# Patient Record
Sex: MALE | Race: WHITE | Employment: FULL TIME | URBAN - METROPOLITAN AREA
[De-identification: names, ages, dates, MRNs, and addresses within clinical notes are randomized per-mention and may not be internally consistent; named-entity substitution may affect disease eponyms.]

---

## 2017-03-15 ENCOUNTER — HOSPITAL ENCOUNTER (OUTPATIENT)
Dept: MRI IMAGING | Age: 52
Discharge: HOME | End: 2017-03-15
Attending: PSYCHIATRY & NEUROLOGY
Payer: COMMERCIAL

## 2017-03-15 DIAGNOSIS — G40.209 PARTIAL SYMPTOMATIC EPILEPSY WITH COMPLEX PARTIAL SEIZURES, NOT INTRACTABLE, WITHOUT STATUS EPILEPTICUS (HCC): ICD-10-CM

## 2017-03-15 PROCEDURE — A9577 INJ MULTIHANCE: HCPCS | Performed by: PSYCHIATRY & NEUROLOGY

## 2017-03-15 PROCEDURE — 70553 MRI BRAIN STEM W/O & W/DYE: CPT

## 2017-03-15 PROCEDURE — 74011250636 HC RX REV CODE- 250/636: Performed by: PSYCHIATRY & NEUROLOGY

## 2017-03-15 RX ORDER — SODIUM CHLORIDE 0.9 % (FLUSH) 0.9 %
10 SYRINGE (ML) INJECTION
Status: COMPLETED | OUTPATIENT
Start: 2017-03-15 | End: 2017-03-15

## 2017-03-15 RX ADMIN — GADOBENATE DIMEGLUMINE 20 ML: 529 INJECTION, SOLUTION INTRAVENOUS at 19:42

## 2017-03-15 RX ADMIN — Medication 10 ML: at 19:42

## 2017-12-05 ENCOUNTER — HOSPITAL ENCOUNTER (OUTPATIENT)
Dept: GENERAL RADIOLOGY | Age: 52
Discharge: HOME | End: 2017-12-05
Attending: FAMILY MEDICINE
Payer: COMMERCIAL

## 2017-12-05 DIAGNOSIS — G89.29 CHRONIC BILATERAL LOW BACK PAIN WITHOUT SCIATICA: ICD-10-CM

## 2017-12-05 DIAGNOSIS — M54.50 CHRONIC BILATERAL LOW BACK PAIN WITHOUT SCIATICA: ICD-10-CM

## 2017-12-05 PROCEDURE — 72100 X-RAY EXAM L-S SPINE 2/3 VWS: CPT

## 2018-08-21 ENCOUNTER — HOSPITAL ENCOUNTER (EMERGENCY)
Age: 53
Discharge: HOME | End: 2018-08-21
Attending: EMERGENCY MEDICINE
Payer: COMMERCIAL

## 2018-08-21 ENCOUNTER — APPOINTMENT (OUTPATIENT)
Dept: GENERAL RADIOLOGY | Age: 53
End: 2018-08-21
Attending: EMERGENCY MEDICINE
Payer: COMMERCIAL

## 2018-08-21 VITALS
HEART RATE: 74 BPM | TEMPERATURE: 98.4 F | SYSTOLIC BLOOD PRESSURE: 127 MMHG | RESPIRATION RATE: 20 BRPM | DIASTOLIC BLOOD PRESSURE: 88 MMHG | WEIGHT: 235 LBS | OXYGEN SATURATION: 98 % | HEIGHT: 76 IN | BODY MASS INDEX: 28.62 KG/M2

## 2018-08-21 DIAGNOSIS — R07.89 CHEST PRESSURE: Primary | ICD-10-CM

## 2018-08-21 LAB
ALBUMIN SERPL-MCNC: 3.3 G/DL (ref 3.5–5)
ALBUMIN/GLOB SERPL: 0.8 {RATIO} (ref 1.2–3.5)
ALP SERPL-CCNC: 73 U/L (ref 50–136)
ALT SERPL-CCNC: 67 U/L (ref 12–65)
ANION GAP SERPL CALC-SCNC: 13 MMOL/L (ref 7–16)
AST SERPL-CCNC: 34 U/L (ref 15–37)
BASOPHILS # BLD: 0 K/UL
BASOPHILS NFR BLD: 0 % (ref 0–2)
BILIRUB SERPL-MCNC: 0.5 MG/DL (ref 0.2–1.1)
BUN SERPL-MCNC: 10 MG/DL (ref 6–23)
CALCIUM SERPL-MCNC: 8.3 MG/DL (ref 8.3–10.4)
CHLORIDE SERPL-SCNC: 105 MMOL/L (ref 98–107)
CO2 SERPL-SCNC: 22 MMOL/L (ref 21–32)
CREAT SERPL-MCNC: 0.88 MG/DL (ref 0.8–1.5)
DIFFERENTIAL METHOD BLD: ABNORMAL
EOSINOPHIL # BLD: 0.1 K/UL
EOSINOPHIL NFR BLD: 1 % (ref 0.5–7.8)
ERYTHROCYTE [DISTWIDTH] IN BLOOD BY AUTOMATED COUNT: 13.2 %
GLOBULIN SER CALC-MCNC: 4.1 G/DL (ref 2.3–3.5)
GLUCOSE SERPL-MCNC: 110 MG/DL (ref 65–100)
HCT VFR BLD AUTO: 45.4 % (ref 41.1–50.3)
HGB BLD-MCNC: 15.8 G/DL (ref 13.6–17.2)
IMM GRANULOCYTES # BLD: 0.1 K/UL
IMM GRANULOCYTES NFR BLD AUTO: 1 % (ref 0–5)
LIPASE SERPL-CCNC: 52 U/L (ref 73–393)
LYMPHOCYTES # BLD: 0.9 K/UL
LYMPHOCYTES NFR BLD: 12 % (ref 13–44)
MCH RBC QN AUTO: 32.3 PG (ref 26.1–32.9)
MCHC RBC AUTO-ENTMCNC: 34.8 G/DL (ref 31.4–35)
MCV RBC AUTO: 92.8 FL (ref 79.6–97.8)
MONOCYTES # BLD: 0.6 K/UL
MONOCYTES NFR BLD: 8 % (ref 4–12)
NEUTS SEG # BLD: 5.7 K/UL
NEUTS SEG NFR BLD: 78 % (ref 43–78)
NRBC # BLD: 0 K/UL (ref 0–0.2)
PLATELET # BLD AUTO: 172 K/UL (ref 150–450)
PMV BLD AUTO: 9 FL (ref 9.4–12.3)
POTASSIUM SERPL-SCNC: 3.3 MMOL/L (ref 3.5–5.1)
PROT SERPL-MCNC: 7.4 G/DL (ref 6.3–8.2)
RBC # BLD AUTO: 4.89 M/UL (ref 4.23–5.6)
SODIUM SERPL-SCNC: 140 MMOL/L (ref 136–145)
TROPONIN I BLD-MCNC: 0 NG/ML (ref 0.02–0.05)
TROPONIN I SERPL-MCNC: <0.02 NG/ML (ref 0.02–0.05)
WBC # BLD AUTO: 7.4 K/UL (ref 4.3–11.1)

## 2018-08-21 PROCEDURE — 85025 COMPLETE CBC W/AUTO DIFF WBC: CPT

## 2018-08-21 PROCEDURE — 84484 ASSAY OF TROPONIN QUANT: CPT

## 2018-08-21 PROCEDURE — 80053 COMPREHEN METABOLIC PANEL: CPT

## 2018-08-21 PROCEDURE — 74011000250 HC RX REV CODE- 250: Performed by: EMERGENCY MEDICINE

## 2018-08-21 PROCEDURE — 99285 EMERGENCY DEPT VISIT HI MDM: CPT | Performed by: EMERGENCY MEDICINE

## 2018-08-21 PROCEDURE — 96374 THER/PROPH/DIAG INJ IV PUSH: CPT | Performed by: EMERGENCY MEDICINE

## 2018-08-21 PROCEDURE — 93005 ELECTROCARDIOGRAM TRACING: CPT | Performed by: EMERGENCY MEDICINE

## 2018-08-21 PROCEDURE — 83690 ASSAY OF LIPASE: CPT

## 2018-08-21 PROCEDURE — 74011250636 HC RX REV CODE- 250/636: Performed by: EMERGENCY MEDICINE

## 2018-08-21 PROCEDURE — 71046 X-RAY EXAM CHEST 2 VIEWS: CPT

## 2018-08-21 PROCEDURE — 74011250637 HC RX REV CODE- 250/637: Performed by: EMERGENCY MEDICINE

## 2018-08-21 RX ORDER — LIDOCAINE HYDROCHLORIDE 20 MG/ML
15 SOLUTION OROPHARYNGEAL
Status: COMPLETED | OUTPATIENT
Start: 2018-08-21 | End: 2018-08-21

## 2018-08-21 RX ORDER — ONDANSETRON 2 MG/ML
4 INJECTION INTRAMUSCULAR; INTRAVENOUS
Status: COMPLETED | OUTPATIENT
Start: 2018-08-21 | End: 2018-08-21

## 2018-08-21 RX ORDER — GUAIFENESIN 100 MG/5ML
324 LIQUID (ML) ORAL
Status: DISCONTINUED | OUTPATIENT
Start: 2018-08-21 | End: 2018-08-21

## 2018-08-21 RX ADMIN — Medication 30 ML: at 12:30

## 2018-08-21 RX ADMIN — LIDOCAINE HYDROCHLORIDE 15 ML: 20 SOLUTION ORAL; TOPICAL at 12:30

## 2018-08-21 RX ADMIN — ONDANSETRON HYDROCHLORIDE 4 MG: 2 INJECTION, SOLUTION INTRAMUSCULAR; INTRAVENOUS at 12:30

## 2018-08-21 NOTE — ED TRIAGE NOTES
Mr. Av Miller started getting SOB and chest pressure yesterday. Pt has also had nausea and diarrhea. Went to doctors and they sent him here. 325mg asa given and 1 nitro. bgl 95. 18g LFA. Nitro relieved any pain and sob.

## 2018-08-21 NOTE — ED NOTES
I have reviewed discharge instructions with the patient. The patient verbalized understanding. Patient to follow up with cardiology as referred and RTED with any changes/concerns. Patient expresses understanding. Patient ambulatory from ED in [facility] with no new Rx.

## 2018-08-21 NOTE — ED PROVIDER NOTES
HPI Comments: Patient started yesterday morning with chest pressure and shortness of breath. Felt like he had a lot of gas in there. Also had nausea vomiting and diarrhea yesterday. Last night drank a Coke and started belching a lot. Symptoms are improved today but still present. Went to [facility] doctor and they gave aspirin and nitroglycerin. Patient feeling better here. Patient is a 48 y.o. male presenting with chest pain. The history is provided by the patient. No  was used. Chest Pain (Angina)    This is a new problem. The current episode started yesterday. The problem has not changed since onset. Duration of episode(s) is 2 days. The problem occurs constantly. The pain is associated with normal activity. The pain is present in the epigastric region (sternal). The pain is moderate. The quality of the pain is described as pressure-like. The pain does not radiate. Associated symptoms include diaphoresis, nausea, shortness of breath and vomiting. Pertinent negatives include no abdominal pain, no back pain, no cough, no dizziness, no exertional chest pressure, no fever, no headaches, no irregular heartbeat, no leg pain, no lower extremity edema, no malaise/fatigue, no numbness, no orthopnea, no palpitations and no weakness. He has tried aspirin and nitroglycerin for the symptoms. The treatment provided moderate relief. Risk factors include male gender. His past medical history does not include DM or HTN. Past Medical History:   Diagnosis Date    Bipolar disorder ([facility] [address]     Hyperlipemia     Seizures ([facility] [address]        Past Surgical History:   Procedure Laterality Date    HX ORTHOPAEDIC           Family History:   Problem Relation Age of Onset    No Known Problems Mother     No Known Problems Father        Social History     Social History    Marital status:      Spouse name: N/A    Number of children: N/A    Years of education: N/A     Occupational History    Not on file. Social History Main Topics    Smoking status: Former Smoker    Smokeless tobacco: Never Used    Alcohol use 6.0 - 7.2 oz/week     10 - 12 Cans of beer, 0 Standard drinks or equivalent per week    Drug use: Not on file    Sexual activity: Not on file     Other Topics Concern    Not on file     Social History Narrative         ALLERGIES: Review of patient's allergies indicates no known allergies. Review of Systems   Constitutional: Positive for diaphoresis. Negative for chills, fever and malaise/fatigue. HENT: Negative for rhinorrhea and sore throat. Eyes: Negative for pain and redness. Respiratory: Positive for shortness of breath. Negative for cough, chest tightness and wheezing. Cardiovascular: Positive for chest pain. Negative for palpitations, orthopnea and leg swelling. Gastrointestinal: Positive for diarrhea, nausea and vomiting. Negative for abdominal pain. Genitourinary: Negative for dysuria and hematuria. Musculoskeletal: Negative for back pain, gait problem, neck pain and neck stiffness. Skin: Negative for color change and rash. Neurological: Negative for dizziness, weakness, numbness and headaches. Vitals:    08/21/18 1148   BP: (!) 129/93   Pulse: 94   Resp: 16   Temp: 98.4 °F (36.9 °C)   SpO2: 99%   Weight: 106.6 kg (235 lb)   Height: 6' 4\" (1.93 m)            Physical Exam   Constitutional: He is oriented to person, place, and time. He appears well-developed and well-nourished. HENT:   Head: Normocephalic and atraumatic. Neck: Normal range of motion. Neck supple. Cardiovascular: Normal rate and regular rhythm. No murmur heard. Pulmonary/Chest: Effort normal and breath sounds normal. He has no wheezes. He exhibits no tenderness. Abdominal: Soft. Bowel sounds are normal. There is no tenderness. Musculoskeletal: Normal range of motion. He exhibits no edema. Neurological: He is alert and oriented to person, place, and time. Skin: Skin is warm and dry. Nursing note and vitals reviewed. MDM  Number of Diagnoses or Management Options  Diagnosis management comments: Chest pressure and bloating. No EKG changes and 2 negative troponins. We'll refer to cardiology. Amount and/or Complexity of Data Reviewed  Clinical lab tests: ordered and reviewed  Tests in the radiology section of CPT®: ordered and reviewed  Tests in the medicine section of CPT®: ordered and reviewed    Patient Progress  Patient progress: stable        ED Course       Procedures    EKG: normal sinus rhythm, nonspecific ST and T waves changes. Rate 99. XR CHEST PA LAT (Final result) Result time: 08/21/18 12:16:40     Final result by Bill Mancuso MD (08/21/18 12:16:40)     Impression:     IMPRESSION: Small band of atelectasis, otherwise unremarkable.         Narrative:     CHEST X-RAY, 2 views. HISTORY:  Shortness breath and chest tightness. TECHNIQUE: PA and lateral views. COMPARISON: None. FINDINGS: Inspiration is shallow. Linear band of atelectasis left midlung zone. Costophrenic angles are sharp.  Heart and pulmonary vasculature is unremarkable.                  Results Include:    Recent Results (from the past 24 hour(s))   AMB POC COMPLETE CBC,AUTOMATED ENTER (RFM)    Collection Time: 08/21/18 10:30 AM   Result Value Ref Range    WBC (POC) 7.0 3.4 - 10.0 10^3/ul    LYMPHOCYTES (POC) 17.2 15.0 - 45.0 %    MONOCYTES (POC) 4.8 2.0 - 11.0 %    GRANULOCYTES (POC) 78.0 (A) 45.0 - 75.0 %    RBC (POC) 4.71 4.50 - 5.90 10^6/ul    HGB (POC) 14.9 13.6 - 17.5 g/dL    HCT (POC) 44.6 41.0 - 53.0 %    MCV (POC) 94.9 80 - 100 fL    MCH (POC) 31.6 26 - 34 pg    MCHC (POC) 33.3 31 - 37 g/dL    PLATELET (POC) 408 277 - 440 10^3/ul   EKG, 12 LEAD, INITIAL    Collection Time: 08/21/18 11:49 AM   Result Value Ref Range    Ventricular Rate 99 BPM    Atrial Rate 99 BPM    P-R Interval 158 ms    QRS Duration 92 ms    Q-T Interval 350 ms    QTC Calculation (Bezet) 449 ms Calculated P Axis 38 degrees    Calculated R Axis 13 degrees    Diagnosis       Normal sinus rhythm  Normal ECG  No previous ECGs available     CBC WITH AUTOMATED DIFF    Collection Time: 08/21/18 11:55 AM   Result Value Ref Range    WBC 7.4 4.3 - 11.1 K/uL    RBC 4.89 4.23 - 5.6 M/uL    HGB 15.8 13.6 - 17.2 g/dL    HCT 45.4 41.1 - 50.3 %    MCV 92.8 79.6 - 97.8 FL    MCH 32.3 26.1 - 32.9 PG    MCHC 34.8 31.4 - 35.0 g/dL    RDW 13.2 %    PLATELET 151 052 - 873 K/uL    MPV 9.0 (L) 9.4 - 12.3 FL    ABSOLUTE NRBC 0.00 0.0 - 0.2 K/uL    NEUTROPHILS 78 43 - 78 %    LYMPHOCYTES 12 (L) 13 - 44 %    MONOCYTES 8 4.0 - 12.0 %    EOSINOPHILS 1 0.5 - 7.8 %    BASOPHILS 0 0.0 - 2.0 %    IMMATURE GRANULOCYTES 1 0.0 - 5.0 %    ABS. NEUTROPHILS 5.7 K/UL    ABS. LYMPHOCYTES 0.9 K/UL    ABS. MONOCYTES 0.6 K/UL    ABS. EOSINOPHILS 0.1 K/UL    ABS. BASOPHILS 0.0 K/UL    ABS. IMM. GRANS. 0.1 K/UL    DF AUTOMATED     METABOLIC PANEL, COMPREHENSIVE    Collection Time: 08/21/18 11:55 AM   Result Value Ref Range    Sodium 140 136 - 145 mmol/L    Potassium 3.3 (L) 3.5 - 5.1 mmol/L    Chloride 105 98 - 107 mmol/L    CO2 22 21 - 32 mmol/L    Anion gap 13 7 - 16 mmol/L    Glucose 110 (H) 65 - 100 mg/dL    BUN 10 6 - 23 MG/DL    Creatinine 0.88 0.8 - 1.5 MG/DL    GFR est AA >60 >60 ml/min/1.73m2    GFR est non-AA >60 >60 ml/min/1.73m2    Calcium 8.3 8.3 - 10.4 MG/DL    Bilirubin, total 0.5 0.2 - 1.1 MG/DL    ALT (SGPT) 67 (H) 12 - 65 U/L    AST (SGOT) 34 15 - 37 U/L    Alk.  phosphatase 73 50 - 136 U/L    Protein, total 7.4 6.3 - 8.2 g/dL    Albumin 3.3 (L) 3.5 - 5.0 g/dL    Globulin 4.1 (H) 2.3 - 3.5 g/dL    A-G Ratio 0.8 (L) 1.2 - 3.5     TROPONIN I    Collection Time: 08/21/18 11:55 AM   Result Value Ref Range    Troponin-I, Qt. <0.02 (L) 0.02 - 0.05 NG/ML   LIPASE    Collection Time: 08/21/18 11:55 AM   Result Value Ref Range    Lipase 52 (L) 73 - 393 U/L   POC TROPONIN-I    Collection Time: 08/21/18  3:12 PM   Result Value Ref Range Troponin-I (POC) 0 (L) 0.02 - 0.05 ng/ml       POC TROPONIN-I (Final result)    Abnormal Component (Lab Inquiry)       Collection Time Result Time TNIPOC     08/21/18 15:12:00 08/21/18 15:25:32 (NOTE)   Values ranging from 0.00 to 0.08 ng/ml represent a 99 percentile    ranking of individuals from a test population that demonstrates a    healthy clinical picture. The ACC recommends that the term myocardial infarction   should be used when there is evidence of myocardial necrosis   in a clinical setting consistent with myocardial ischemia. Under these conditions, any of the following meet the   diagnosis for myocardial infarction:      Detection of rise and/or fall of cardiac biomarkers   (preferably Troponin-I) with at least one value above the   99th percentile of the upper reference limit (URL) together   with evidence of myocardial ischemia with one of the   following: symptoms of ischemia, ECG changes indicative of   new ischemia, development of pathological Q waves, imaging   evidence of new loss of viable myocardium or new regional   wall motion abnormality. Sequential testing is recommended. Cardiac Troponin-I has a    relatively long half-life and may b e present well after the CK MB has    returned to baseline.    ' data-bubble=\"[email or web address]\">0 (L)  (NOTE)   Values ranging. ..

## 2018-08-21 NOTE — DISCHARGE INSTRUCTIONS
Chest Pain: Care Instructions  Your Care Instructions    There are many things that can cause chest pain. Some are not serious and will get better on their own in a few days. But some kinds of chest pain need more testing and treatment. Your doctor may have recommended a follow-up visit in the next 8 to 12 hours. If you are not getting better, you may need more tests or treatment. Even though your doctor has released you, you still need to watch for any problems. The doctor carefully checked you, but sometimes problems can develop later. If you have new symptoms or if your symptoms do not get better, get medical care right away. If you have worse or different chest pain or pressure that lasts more than 5 minutes or you passed out (lost consciousness), call 911 or seek other emergency help right away. A medical visit is only one step in your treatment. Even if you feel better, you still need to do what your doctor recommends, such as going to all suggested follow-up appointments and taking medicines exactly as directed. This will help you recover and help prevent future problems. How can you care for yourself at home? · Rest until you feel better. · Take your medicine exactly as prescribed. Call your doctor if you think you are having a problem with your medicine. · Do not drive after taking a prescription pain medicine. When should you call for help? Call 911 if:    · You passed out (lost consciousness).     · You have severe difficulty breathing.     · You have symptoms of a heart attack. These may include:  ¨ Chest pain or pressure, or a strange feeling in your chest.  ¨ Sweating. ¨ Shortness of breath. ¨ Nausea or vomiting. ¨ Pain, pressure, or a strange feeling in your back, neck, jaw, or upper belly or in one or both shoulders or arms. ¨ Lightheadedness or sudden weakness. ¨ A fast or irregular heartbeat.   After you call 911, the  may tell you to chew 1 adult-strength or 2 to 4 low-dose aspirin. Wait for an ambulance. Do not try to drive yourself.    Call your doctor today if:    · You have any trouble breathing.     · Your chest pain gets worse.     · You are dizzy or lightheaded, or you feel like you may faint.     · You are not getting better as expected.     · You are having new or different chest pain. Where can you learn more? Go to http://nikhil-sylvia.info/. Enter A120 in the search box to learn more about \"Chest Pain: Care Instructions. \"  Current as of: November 20, 2017  Content Version: 11.7  © 9866-8442 [employer]. Care instructions adapted under license by [employer] (which disclaims liability or warranty for this information). If you have questions about a medical condition or this instruction, always ask your healthcare professional. [address] any warranty or liability for your use of this information.

## 2018-08-22 LAB
ATRIAL RATE: 99 BPM
CALCULATED P AXIS, ECG09: 38 DEGREES
CALCULATED R AXIS, ECG10: 13 DEGREES
DIAGNOSIS, 93000: NORMAL
P-R INTERVAL, ECG05: 158 MS
Q-T INTERVAL, ECG07: 350 MS
QRS DURATION, ECG06: 92 MS
QTC CALCULATION (BEZET), ECG08: 449 MS
VENTRICULAR RATE, ECG03: 99 BPM

## 2020-02-26 PROBLEM — N48.6 PEYRONIE'S DISEASE: Status: ACTIVE | Noted: 2020-02-26

## 2021-03-17 ENCOUNTER — ANESTHESIA EVENT (OUTPATIENT)
Dept: SURGERY | Age: 56
End: 2021-03-17
Payer: COMMERCIAL

## 2021-03-18 ENCOUNTER — ANESTHESIA (OUTPATIENT)
Dept: SURGERY | Age: 56
End: 2021-03-18
Payer: COMMERCIAL

## 2021-03-18 ENCOUNTER — HOSPITAL ENCOUNTER (OUTPATIENT)
Age: 56
Setting detail: OBSERVATION
Discharge: HOME | End: 2021-03-19
Attending: UROLOGY | Admitting: UROLOGY
Payer: COMMERCIAL

## 2021-03-18 DIAGNOSIS — N52.9 ERECTILE DYSFUNCTION, UNSPECIFIED ERECTILE DYSFUNCTION TYPE: ICD-10-CM

## 2021-03-18 DIAGNOSIS — Z98.890 S/P UROLOGICAL SURGERY: Primary | ICD-10-CM

## 2021-03-18 DIAGNOSIS — N48.6 PEYRONIE'S DISEASE: ICD-10-CM

## 2021-03-18 LAB
APPEARANCE UR: CLEAR
BILIRUB UR QL: NEGATIVE
COLOR UR: YELLOW
CREAT SERPL-MCNC: 1.34 MG/DL (ref 0.8–1.5)
ERYTHROCYTE [DISTWIDTH] IN BLOOD BY AUTOMATED COUNT: 13.2 % (ref 11.9–14.6)
GLUCOSE UR STRIP.AUTO-MCNC: NEGATIVE MG/DL
HCT VFR BLD AUTO: 44.3 % (ref 41.1–50.3)
HGB BLD-MCNC: 14.7 G/DL (ref 13.6–17.2)
HGB UR QL STRIP: NEGATIVE
KETONES UR QL STRIP.AUTO: NEGATIVE MG/DL
LEUKOCYTE ESTERASE UR QL STRIP.AUTO: NEGATIVE
MCH RBC QN AUTO: 31.4 PG (ref 26.1–32.9)
MCHC RBC AUTO-ENTMCNC: 33.2 G/DL (ref 31.4–35)
MCV RBC AUTO: 94.7 FL (ref 79.6–97.8)
NITRITE UR QL STRIP.AUTO: NEGATIVE
NRBC # BLD: 0 K/UL (ref 0–0.2)
PH UR STRIP: 5.5 [PH] (ref 5–9)
PLATELET # BLD AUTO: 165 K/UL (ref 150–450)
PMV BLD AUTO: 8.8 FL (ref 9.4–12.3)
PROT UR STRIP-MCNC: NEGATIVE MG/DL
RBC # BLD AUTO: 4.68 M/UL (ref 4.23–5.6)
SP GR UR REFRACTOMETRY: 1.02 (ref 1–1.02)
UROBILINOGEN UR QL STRIP.AUTO: 0.2 EU/DL (ref 0.2–1)
WBC # BLD AUTO: 6 K/UL (ref 4.3–11.1)

## 2021-03-18 PROCEDURE — 77030010509 HC AIRWY LMA MSK TELE -A: Performed by: ANESTHESIOLOGY

## 2021-03-18 PROCEDURE — 82565 ASSAY OF CREATININE: CPT

## 2021-03-18 PROCEDURE — 81003 URINALYSIS AUTO W/O SCOPE: CPT

## 2021-03-18 PROCEDURE — 74011250636 HC RX REV CODE- 250/636: Performed by: UROLOGY

## 2021-03-18 PROCEDURE — 74011250636 HC RX REV CODE- 250/636: Performed by: NURSE ANESTHETIST, CERTIFIED REGISTERED

## 2021-03-18 PROCEDURE — 74011000258 HC RX REV CODE- 258: Performed by: UROLOGY

## 2021-03-18 PROCEDURE — 74011000250 HC RX REV CODE- 250: Performed by: NURSE ANESTHETIST, CERTIFIED REGISTERED

## 2021-03-18 PROCEDURE — 74011000250 HC RX REV CODE- 250: Performed by: UROLOGY

## 2021-03-18 PROCEDURE — 77030040922 HC BLNKT HYPOTHRM STRY -A: Performed by: ANESTHESIOLOGY

## 2021-03-18 PROCEDURE — 2709999900 HC NON-CHARGEABLE SUPPLY

## 2021-03-18 PROCEDURE — 77030020229 HC RETRCTR SCROT SYS BSC -D: Performed by: UROLOGY

## 2021-03-18 PROCEDURE — 36415 COLL VENOUS BLD VENIPUNCTURE: CPT

## 2021-03-18 PROCEDURE — 74011250637 HC RX REV CODE- 250/637: Performed by: ANESTHESIOLOGY

## 2021-03-18 PROCEDURE — 77030002966 HC SUT PDS J&J -A: Performed by: UROLOGY

## 2021-03-18 PROCEDURE — 76060000036 HC ANESTHESIA 2.5 TO 3 HR: Performed by: UROLOGY

## 2021-03-18 PROCEDURE — 2709999900 HC NON-CHARGEABLE SUPPLY: Performed by: UROLOGY

## 2021-03-18 PROCEDURE — 77030002888 HC SUT CHRMC J&J -A: Performed by: UROLOGY

## 2021-03-18 PROCEDURE — 99218 HC RM OBSERVATION: CPT

## 2021-03-18 PROCEDURE — 76210000006 HC OR PH I REC 0.5 TO 1 HR: Performed by: UROLOGY

## 2021-03-18 PROCEDURE — 77030040831 HC BAG URINE DRNG MDII -A: Performed by: UROLOGY

## 2021-03-18 PROCEDURE — 76010000172 HC OR TIME 2.5 TO 3 HR INTENSV-TIER 1: Performed by: UROLOGY

## 2021-03-18 PROCEDURE — 77030019908 HC STETH ESOPH SIMS -A: Performed by: ANESTHESIOLOGY

## 2021-03-18 PROCEDURE — 77030034696 HC CATH URETH FOL 2W BARD -A: Performed by: UROLOGY

## 2021-03-18 PROCEDURE — 77030013782 HC KT PENIL INFL AMS -C: Performed by: UROLOGY

## 2021-03-18 PROCEDURE — 77030018673: Performed by: UROLOGY

## 2021-03-18 PROCEDURE — 74011250636 HC RX REV CODE- 250/636: Performed by: ANESTHESIOLOGY

## 2021-03-18 PROCEDURE — 77030040361 HC SLV COMPR DVT MDII -B: Performed by: UROLOGY

## 2021-03-18 PROCEDURE — 77030040361 HC SLV COMPR DVT MDII -B

## 2021-03-18 PROCEDURE — 54405 INSERT MULTI-COMP PENIS PROS: CPT | Performed by: UROLOGY

## 2021-03-18 PROCEDURE — 77030040503 HC DRN WND PENRS MDII -A: Performed by: UROLOGY

## 2021-03-18 PROCEDURE — 74011250637 HC RX REV CODE- 250/637: Performed by: UROLOGY

## 2021-03-18 PROCEDURE — 77030020269 HC MISC IMPL: Performed by: UROLOGY

## 2021-03-18 PROCEDURE — 85027 COMPLETE CBC AUTOMATED: CPT

## 2021-03-18 PROCEDURE — 77030031139 HC SUT VCRL2 J&J -A: Performed by: UROLOGY

## 2021-03-18 PROCEDURE — 77030010507 HC ADH SKN DERMBND J&J -B: Performed by: UROLOGY

## 2021-03-18 RX ORDER — SODIUM CHLORIDE, SODIUM LACTATE, POTASSIUM CHLORIDE, CALCIUM CHLORIDE 600; 310; 30; 20 MG/100ML; MG/100ML; MG/100ML; MG/100ML
100 INJECTION, SOLUTION INTRAVENOUS CONTINUOUS
Status: DISCONTINUED | OUTPATIENT
Start: 2021-03-18 | End: 2021-03-18 | Stop reason: HOSPADM

## 2021-03-18 RX ORDER — FENTANYL CITRATE 50 UG/ML
INJECTION, SOLUTION INTRAMUSCULAR; INTRAVENOUS AS NEEDED
Status: DISCONTINUED | OUTPATIENT
Start: 2021-03-18 | End: 2021-03-18 | Stop reason: HOSPADM

## 2021-03-18 RX ORDER — VANCOMYCIN HYDROCHLORIDE 1 G/20ML
INJECTION, POWDER, LYOPHILIZED, FOR SOLUTION INTRAVENOUS EVERY 24 HOURS
Status: DISCONTINUED | OUTPATIENT
Start: 2021-03-18 | End: 2021-03-19 | Stop reason: SDUPTHER

## 2021-03-18 RX ORDER — SODIUM CHLORIDE 0.9 % (FLUSH) 0.9 %
5-40 SYRINGE (ML) INJECTION EVERY 8 HOURS
Status: DISCONTINUED | OUTPATIENT
Start: 2021-03-18 | End: 2021-03-18 | Stop reason: HOSPADM

## 2021-03-18 RX ORDER — GENTAMICIN SULFATE 40 MG/ML
INJECTION, SOLUTION INTRAMUSCULAR; INTRAVENOUS AS NEEDED
Status: DISCONTINUED | OUTPATIENT
Start: 2021-03-18 | End: 2021-03-18 | Stop reason: HOSPADM

## 2021-03-18 RX ORDER — LAMOTRIGINE 100 MG/1
200 TABLET ORAL DAILY
Status: DISCONTINUED | OUTPATIENT
Start: 2021-03-19 | End: 2021-03-19 | Stop reason: HOSPADM

## 2021-03-18 RX ORDER — FENTANYL CITRATE 50 UG/ML
100 INJECTION, SOLUTION INTRAMUSCULAR; INTRAVENOUS ONCE
Status: DISCONTINUED | OUTPATIENT
Start: 2021-03-18 | End: 2021-03-18 | Stop reason: HOSPADM

## 2021-03-18 RX ORDER — NALOXONE HYDROCHLORIDE 0.4 MG/ML
0.04 INJECTION, SOLUTION INTRAMUSCULAR; INTRAVENOUS; SUBCUTANEOUS
Status: DISCONTINUED | OUTPATIENT
Start: 2021-03-18 | End: 2021-03-18 | Stop reason: HOSPADM

## 2021-03-18 RX ORDER — MIDAZOLAM HYDROCHLORIDE 1 MG/ML
2 INJECTION, SOLUTION INTRAMUSCULAR; INTRAVENOUS
Status: COMPLETED | OUTPATIENT
Start: 2021-03-18 | End: 2021-03-18

## 2021-03-18 RX ORDER — HYDROMORPHONE HYDROCHLORIDE 1 MG/ML
1 INJECTION, SOLUTION INTRAMUSCULAR; INTRAVENOUS; SUBCUTANEOUS
Status: DISCONTINUED | OUTPATIENT
Start: 2021-03-18 | End: 2021-03-19 | Stop reason: HOSPADM

## 2021-03-18 RX ORDER — SODIUM CHLORIDE 0.9 % (FLUSH) 0.9 %
5-40 SYRINGE (ML) INJECTION EVERY 8 HOURS
Status: DISCONTINUED | OUTPATIENT
Start: 2021-03-18 | End: 2021-03-19 | Stop reason: HOSPADM

## 2021-03-18 RX ORDER — LIDOCAINE HYDROCHLORIDE 10 MG/ML
0.1 INJECTION INFILTRATION; PERINEURAL AS NEEDED
Status: DISCONTINUED | OUTPATIENT
Start: 2021-03-18 | End: 2021-03-18 | Stop reason: HOSPADM

## 2021-03-18 RX ORDER — ONDANSETRON 2 MG/ML
4 INJECTION INTRAMUSCULAR; INTRAVENOUS
Status: DISCONTINUED | OUTPATIENT
Start: 2021-03-18 | End: 2021-03-19 | Stop reason: HOSPADM

## 2021-03-18 RX ORDER — HYDROCODONE BITARTRATE AND ACETAMINOPHEN 5; 325 MG/1; MG/1
1 TABLET ORAL
Status: DISCONTINUED | OUTPATIENT
Start: 2021-03-18 | End: 2021-03-19 | Stop reason: HOSPADM

## 2021-03-18 RX ORDER — MIDAZOLAM HYDROCHLORIDE 1 MG/ML
2 INJECTION, SOLUTION INTRAMUSCULAR; INTRAVENOUS ONCE
Status: DISCONTINUED | OUTPATIENT
Start: 2021-03-18 | End: 2021-03-18 | Stop reason: HOSPADM

## 2021-03-18 RX ORDER — LAMOTRIGINE 100 MG/1
300 TABLET ORAL
Status: DISCONTINUED | OUTPATIENT
Start: 2021-03-18 | End: 2021-03-19 | Stop reason: HOSPADM

## 2021-03-18 RX ORDER — PROPOFOL 10 MG/ML
INJECTION, EMULSION INTRAVENOUS AS NEEDED
Status: DISCONTINUED | OUTPATIENT
Start: 2021-03-18 | End: 2021-03-18 | Stop reason: HOSPADM

## 2021-03-18 RX ORDER — EPHEDRINE SULFATE/0.9% NACL/PF 50 MG/5 ML
SYRINGE (ML) INTRAVENOUS AS NEEDED
Status: DISCONTINUED | OUTPATIENT
Start: 2021-03-18 | End: 2021-03-18 | Stop reason: HOSPADM

## 2021-03-18 RX ORDER — ONDANSETRON 2 MG/ML
INJECTION INTRAMUSCULAR; INTRAVENOUS AS NEEDED
Status: DISCONTINUED | OUTPATIENT
Start: 2021-03-18 | End: 2021-03-18 | Stop reason: HOSPADM

## 2021-03-18 RX ORDER — DEXTROSE MONOHYDRATE AND SODIUM CHLORIDE 5; .45 G/100ML; G/100ML
75 INJECTION, SOLUTION INTRAVENOUS CONTINUOUS
Status: DISCONTINUED | OUTPATIENT
Start: 2021-03-18 | End: 2021-03-19 | Stop reason: HOSPADM

## 2021-03-18 RX ORDER — SODIUM CHLORIDE 0.9 % (FLUSH) 0.9 %
5-40 SYRINGE (ML) INJECTION AS NEEDED
Status: DISCONTINUED | OUTPATIENT
Start: 2021-03-18 | End: 2021-03-19 | Stop reason: HOSPADM

## 2021-03-18 RX ORDER — CEFAZOLIN SODIUM/WATER 2 G/20 ML
2 SYRINGE (ML) INTRAVENOUS ONCE
Status: DISCONTINUED | OUTPATIENT
Start: 2021-03-18 | End: 2021-03-18 | Stop reason: HOSPADM

## 2021-03-18 RX ORDER — HYDROMORPHONE HYDROCHLORIDE 1 MG/ML
0.5 INJECTION, SOLUTION INTRAMUSCULAR; INTRAVENOUS; SUBCUTANEOUS
Status: DISCONTINUED | OUTPATIENT
Start: 2021-03-18 | End: 2021-03-18 | Stop reason: HOSPADM

## 2021-03-18 RX ORDER — ACETAMINOPHEN 325 MG/1
650 TABLET ORAL
Status: DISCONTINUED | OUTPATIENT
Start: 2021-03-18 | End: 2021-03-19 | Stop reason: HOSPADM

## 2021-03-18 RX ORDER — SODIUM CHLORIDE 0.9 % (FLUSH) 0.9 %
5-40 SYRINGE (ML) INJECTION AS NEEDED
Status: DISCONTINUED | OUTPATIENT
Start: 2021-03-18 | End: 2021-03-18 | Stop reason: HOSPADM

## 2021-03-18 RX ORDER — CEFAZOLIN SODIUM 1 G/3ML
INJECTION, POWDER, FOR SOLUTION INTRAMUSCULAR; INTRAVENOUS AS NEEDED
Status: DISCONTINUED | OUTPATIENT
Start: 2021-03-18 | End: 2021-03-18 | Stop reason: HOSPADM

## 2021-03-18 RX ORDER — OXYCODONE HYDROCHLORIDE 5 MG/1
5 TABLET ORAL
Status: COMPLETED | OUTPATIENT
Start: 2021-03-18 | End: 2021-03-18

## 2021-03-18 RX ORDER — LIDOCAINE HYDROCHLORIDE 20 MG/ML
INJECTION, SOLUTION EPIDURAL; INFILTRATION; INTRACAUDAL; PERINEURAL AS NEEDED
Status: DISCONTINUED | OUTPATIENT
Start: 2021-03-18 | End: 2021-03-18 | Stop reason: HOSPADM

## 2021-03-18 RX ORDER — NALOXONE HYDROCHLORIDE 0.4 MG/ML
0.4 INJECTION, SOLUTION INTRAMUSCULAR; INTRAVENOUS; SUBCUTANEOUS AS NEEDED
Status: DISCONTINUED | OUTPATIENT
Start: 2021-03-18 | End: 2021-03-19 | Stop reason: HOSPADM

## 2021-03-18 RX ADMIN — PHENYLEPHRINE HYDROCHLORIDE 100 MCG: 10 INJECTION INTRAVENOUS at 08:12

## 2021-03-18 RX ADMIN — FENTANYL CITRATE 50 MCG: 50 INJECTION INTRAMUSCULAR; INTRAVENOUS at 07:47

## 2021-03-18 RX ADMIN — FENTANYL CITRATE 50 MCG: 50 INJECTION INTRAMUSCULAR; INTRAVENOUS at 10:07

## 2021-03-18 RX ADMIN — PROPOFOL 100 MG: 10 INJECTION, EMULSION INTRAVENOUS at 07:46

## 2021-03-18 RX ADMIN — PROPOFOL 200 MG: 10 INJECTION, EMULSION INTRAVENOUS at 07:43

## 2021-03-18 RX ADMIN — FENTANYL CITRATE 50 MCG: 50 INJECTION INTRAMUSCULAR; INTRAVENOUS at 09:06

## 2021-03-18 RX ADMIN — HYDROCODONE BITARTRATE AND ACETAMINOPHEN 1 TABLET: 5; 325 TABLET ORAL at 23:55

## 2021-03-18 RX ADMIN — SODIUM CHLORIDE, SODIUM LACTATE, POTASSIUM CHLORIDE, AND CALCIUM CHLORIDE: 600; 310; 30; 20 INJECTION, SOLUTION INTRAVENOUS at 08:27

## 2021-03-18 RX ADMIN — LIDOCAINE HYDROCHLORIDE 100 MG: 20 INJECTION, SOLUTION EPIDURAL; INFILTRATION; INTRACAUDAL; PERINEURAL at 07:43

## 2021-03-18 RX ADMIN — Medication 10 MG: at 08:27

## 2021-03-18 RX ADMIN — PROPOFOL 30 MG: 10 INJECTION, EMULSION INTRAVENOUS at 09:05

## 2021-03-18 RX ADMIN — PHENYLEPHRINE HYDROCHLORIDE 100 MCG: 10 INJECTION INTRAVENOUS at 08:27

## 2021-03-18 RX ADMIN — FENTANYL CITRATE 50 MCG: 50 INJECTION INTRAMUSCULAR; INTRAVENOUS at 08:20

## 2021-03-18 RX ADMIN — DEXTROSE MONOHYDRATE AND SODIUM CHLORIDE 75 ML/HR: 5; .45 INJECTION, SOLUTION INTRAVENOUS at 18:16

## 2021-03-18 RX ADMIN — SODIUM CHLORIDE, SODIUM LACTATE, POTASSIUM CHLORIDE, AND CALCIUM CHLORIDE 100 ML/HR: 600; 310; 30; 20 INJECTION, SOLUTION INTRAVENOUS at 06:47

## 2021-03-18 RX ADMIN — OXYCODONE 5 MG: 5 TABLET ORAL at 12:30

## 2021-03-18 RX ADMIN — MIDAZOLAM 2 MG: 1 INJECTION INTRAMUSCULAR; INTRAVENOUS at 06:57

## 2021-03-18 RX ADMIN — GENTAMICIN SULFATE 530 MG: 40 INJECTION, SOLUTION INTRAMUSCULAR; INTRAVENOUS at 06:48

## 2021-03-18 RX ADMIN — Medication 10 MG: at 10:13

## 2021-03-18 RX ADMIN — Medication 10 MG: at 07:54

## 2021-03-18 RX ADMIN — ONDANSETRON 4 MG: 2 INJECTION INTRAMUSCULAR; INTRAVENOUS at 10:11

## 2021-03-18 RX ADMIN — PHENYLEPHRINE HYDROCHLORIDE 100 MCG: 10 INJECTION INTRAVENOUS at 08:05

## 2021-03-18 RX ADMIN — Medication 5 ML: at 18:20

## 2021-03-18 RX ADMIN — LAMOTRIGINE 300 MG: 100 TABLET ORAL at 23:54

## 2021-03-18 RX ADMIN — PHENYLEPHRINE HYDROCHLORIDE 100 MCG: 10 INJECTION INTRAVENOUS at 07:55

## 2021-03-18 NOTE — PROGRESS NOTES
TRANSFER - IN REPORT:    Verbal report received from Joel Petit RN on [facility]  being received from PACU for routine progression of care      Report consisted of patients Situation, Background, Assessment and   Recommendations(SBAR). Information from the following report(s) SBAR was reviewed with the receiving nurse. Opportunity for questions and clarification was provided. Assessment completed upon patients arrival to unit and care assumed.

## 2021-03-18 NOTE — PERIOP NOTES
Report to Marta Castaneda RN for continuation of care. Care assumed at this time. Pt resting in bed, pt denies any additional needs at this time. Pt respirations even and unlabored. Pt bed locked and low, call bell within reach. Vital signs being monitored.

## 2021-03-18 NOTE — ANESTHESIA POSTPROCEDURE EVALUATION
Procedure(s):  PENILE PROSTHESIS INSERTION INFLATABLE/ POSS PLAQUE MODELING OR INCISION OF PLAQUE. general    Anesthesia Post Evaluation      Multimodal analgesia: multimodal analgesia used between 6 hours prior to anesthesia start to PACU discharge  Patient location during evaluation: PACU  Patient participation: complete - patient participated  Level of consciousness: awake and alert  Pain management: adequate  Airway patency: patent  Anesthetic complications: no  Cardiovascular status: acceptable  Respiratory status: acceptable  Hydration status: acceptable  Post anesthesia nausea and vomiting:  controlled  Final Post Anesthesia Temperature Assessment:  Normothermia (36.0-37.5 degrees C)      INITIAL Post-op Vital signs:   Vitals Value Taken Time   /88 03/18/21 1431   Temp 37 °C (98.6 °F) 03/18/21 1115   Pulse 102 03/18/21 1435   Resp 14 03/18/21 1310   SpO2 94 % 03/18/21 1435   Vitals shown include unvalidated device data.

## 2021-03-18 NOTE — PERIOP NOTES
TRANSFER - OUT REPORT:    Verbal report given to [facility] RN on Cristian Powell  being transferred to  for routine post - op       Report consisted of patients Situation, Background, Assessment and   Recommendations(SBAR). Information from the following report(s) SBAR, OR Summary, Procedure Summary, Intake/Output and MAR was reviewed with the receiving nurse. Lines:   Peripheral IV 03/18/21 Right Hand (Active)   Site Assessment Clean, dry, & intact 03/18/21 1115   Phlebitis Assessment 0 03/18/21 1115   Infiltration Assessment 0 03/18/21 1115   Dressing Status Clean, dry, & intact; Occlusive 03/18/21 1115   Dressing Type Transparent;Tape 03/18/21 1115   Hub Color/Line Status Green;Patent 03/18/21 1115   Alcohol Cap Used No 03/18/21 1115        Opportunity for questions and clarification was provided. Patient transported with:   O2 @ 4 liters    VTE prophylaxis orders have been written for Cristian Powell. Patient and family given floor number and nurses name. Family updated re: pt status after security code verified.

## 2021-03-18 NOTE — BRIEF OP NOTE
Brief Postoperative Note    Patient: Willie Castellanos  YOB: 1965  MRN: [id number]    Date of Procedure: 3/18/2021     Pre-Op Diagnosis: Erectile dysfunction, unspecified erectile dysfunction type [N52.9]  Peyronie's disease [N48.6]    Post-Op Diagnosis: Same as preoperative diagnosis. Procedure(s):  PENILE PROSTHESIS INSERTION INFLATABLE/ POSS PLAQUE MODELING OR INCISION OF PLAQUE    Surgeon(s):  Jazmin Ospina MD    Surgical Assistant: None    Anesthesia: General     Estimated Blood Loss (mL): less than 50     Complications: None    Specimens: * No specimens in log *     Implants:   Implant Name Type Inv.  Item Serial No.  Lot No. LRB No. Used Action    Accessory Kit     [id number] N/A 1 Implanted    Spherical Resevoir with InhibiZone     [id number] N/A 1 Implanted   ams 700 cx ms pump     [id number] N/A 1 Implanted   RTE 2.0 CM     [id number] N/A 1 Implanted       Drains: * No LDAs found *    Findings: see op note    Electronically Signed by Duc Sterling MD on 3/18/2021 at 10:31 AM

## 2021-03-18 NOTE — OP NOTES
[facility]  OPERATIVE REPORT    Name:  Inez Carlson  MR#:  [id number]  :  1965  ACCOUNT #:  [de-identified]  DATE OF SERVICE:  2021    PREOPERATIVE DIAGNOSES:  1. Peyronie disease. 2.  Erectile dysfunction. POSTOPERATIVE DIAGNOSES:  1. Peyronie disease. 2.  Erectile dysfunction. PROCEDURE PERFORMED:  Placement of inflatable penile prosthesis with manual modeling of penile curvature. SURGEON:  Alexis Amado MD    ASSISTANT:  None. ANESTHESIA:  General.    COMPLICATIONS:  None. SPECIMENS REMOVED:  None. IMPLANTS:   CX prosthesis. ESTIMATED BLOOD LOSS:  20 mL    FINDINGS:  The patient had penile curvature. Test erection after placement of IPP shows approximately 30-degree curvature dorsally into the left which was repaired by penile modeling. Placement of  CX device with 18-cm cylinders plus 2-cm rear tip extenders. PROCEDURE:  The patient was given a general anesthetic and placed in the supine position. The penis and lower abdomen were shaved, prepped and draped in sterile fashion. The usual prep was performed for placement of inflatable penile prosthesis. Examination shows a normal penis. We did a test inflation by placing a tourniquet proximally and placing a butterfly needle. Injectable saline was used to generate a moderate erection. On exam, the patient had a plaque about mid dorsum but the curvature was more distally and to the left. The concavity was distal to the obvious large plaque. The penis was deflated. Castellano catheter was placed. We used a ring retractor with a hook through the penile meatus placing it on stretch over a silicone band. A penoscrotal incision was made transversely. We dissected down to the proximal corpora. Corporotomy was made on the left side with the Bovie. Stay sutures of 2-0 PDS were placed on either side. I dilated proximally and distally with the Tamiko Lizz dilators from 9 up to 13 mm. I was able to then measure the corpus on the left side at a total of 20 cm. There was 8 cm proximally and 7 distally. Procedure was repeated on the right side, again placing corporotomy, stay sutures, and dilating proximally and distally. The right side also measured at 20 cm with 12 plus 8 cm. We chose an  CX device with an 18-cm cylinder and 2-cm rear tip extenders. We placed the 65-mL spherical reservoir by piercing the floor of the inguinal canal on the right side. It was placed in the space of Retzius and appeared to be in good position. We inflated it with 65 mL of saline. At this point, we proceeded to a test erection. We placed the previously noted penile prosthesis using the Donnell insertion device. After these were placed, we closed the corporotomies by tying the stay sutures across the midline. A test inflation was made by connecting a large syringe to the pump. The penis was inflated. There was moderate curvature to the left distally. We clamped the tubing to the syringe and also to the cylinders using a rubber shod device. I then did manual modeling to straighten out the curvature. This was done under pressure for at least 30 seconds. The penis did show satisfactory straightening. I thought that the distal cylinder on the right was not reaching the mid glans. We had removed these sutures from the ends of the cylinders. I opened the corporotomy on the right side and removed the cylinder. We redilated distally using the [facility] dilators again up to 12. I then removed the device and measured proximally and distally. Again, it measured 20 cm. We replaced it by placing a 3-0 Vicryl through the tip of the cylinder and used the Donnell insertion device with the Sonia needle to place the device again in the right corpus. Corporotomy was closed and we again did a test erection.   I felt that both of the cylinders were of equal placement distally and that the glans was a little floppy but we did not think we could place the cylinder any more distally in the glans. The curvature was definitely improved. At this point, the pump was placed in the subdartos position on the right scrotum. We made the connection using a quick-connect device between the reservoir and the pump assembly. I placed two pumps in the cylinders prior to closure. The dartos fascia was closed using running 3-0 Vicryl. Skin was closed using interrupted 3-0 chromic. Dermabond was placed on the skin. We wrapped the penis in Coban and did a foam tape pressure dressing across the scrotum. Castellano was left indwelling. PLAN:  He will be admitted for observation.       Jorge Sandifer, MD [id number]  D:  03/18/2021 10:41  T:  03/18/2021 19:02  JOB #:  [id number]

## 2021-03-18 NOTE — H&P
Cristian Powell  : 1965         Chief Complaint   Patient presents with    Peyronie's Disease    Follow-up         HPI      Cristian Powell is a 50 y.o. male Referred by Dr. Krys Barnes for evaluation and treatment of Peyronie's disease. He noted dorsal penile curvature around 2019. He noted this fairly acutely, noted it overnight he has Viagra but doesn't need it for erections. Curvature doesn't interfere with intercourse. Doesn't recall any injury   Exam showed 3 cm distal dorsal plaque. He brought in phone photo of his erect penis. Has 50 degree curvature dorsally due to distal dorsal plaque . Completed 1 cycle of Xiaflex on 20. He stated that he is no better.     Completed 2nd cycle Xiaflex 20. He has been doing stretching exercises and states that his erections are still curved dorsally, he estimates that he is 10% better. He doesn't think that he can have sex with the amount of curvature.   Here for test erection, marking  Injected 0.1 ml Trimix. Had good erection with dorsal curvature. Marked concavity with marker.   Had 2nd injection of 3rd cycle 20. He sates that his penile curvature is no better. Completed 4th cycle 21. His wife is with him today. She states that the curvature is so bad that it hurts her. His curvature is no better. Has partial ED.           Past Medical History:   Diagnosis Date    Bipolar disorder ([facility] [address]      Hyperlipemia      Peyronie's disease      Seizures (HCC)              Past Surgical History:   Procedure Laterality Date    HX ORTHOPAEDIC                 Current Outpatient Medications   Medication Sig Dispense Refill    LORazepam (ATIVAN) 0.5 mg tablet Take 1 tab one hour prior to procedure. May repeat at MRI if still anxious        trimethoprim-sulfamethoxazole (Bactrim DS) 160-800 mg per tablet Take 1 Tab by mouth two (2) times a day.  Start 3 days before surgery 40 Tab 0    ciprofloxacin HCl (Cipro) 500 mg tablet Take 1 Tab by mouth two (2) times a day for 20 days. Start 3 days before procedure 40 Tab 0    lamoTRIgine (LaMICtal) 100 mg tablet Take 2 in AM and 3 at hs 70 Tab 3    simvastatin (ZOCOR) 40 mg tablet TAKE 1 TABLET DAILY IN THE EVENING 90 Tab 3    sildenafil citrate (VIAGRA) 50 mg tablet Take 1 Tab by mouth as needed for Erectile Dysfunction. 30 Tab 1    omeprazole (PRILOSEC) 40 mg capsule TAKE 1 CAPSULE DAILY 90 Cap 4      No Known Allergies  Social History            Socioeconomic History    Marital status:        Spouse name: Not on file    Number of children: Not on file    Years of education: Not on file    Highest education level: Not on file   Occupational History    Not on file   Social Needs    Financial resource strain: Not on file    Food insecurity       Worry: Not on file       Inability: Not on file    Transportation needs       Medical: Not on file       Non-medical: Not on file   Tobacco Use    Smoking status: Former Smoker    Smokeless tobacco: Never Used   Substance and Sexual Activity    Alcohol use:  Yes       Alcohol/week: 10.0 - 12.0 standard drinks       Types: 10 - 12 Cans of beer per week    Drug use: Never    Sexual activity: Yes       Partners: Female   Lifestyle    Physical activity       Days per week: Not on file       Minutes per session: Not on file    Stress: Not on file   Relationships    Social connections       Talks on phone: Not on file       Gets together: Not on file       Attends Faith service: Not on file       Active member of club or organization: Not on file       Attends meetings of clubs or organizations: Not on file       Relationship status: Not on file    Intimate partner violence       Fear of current or ex partner: Not on file       Emotionally abused: Not on file       Physically abused: Not on file       Forced sexual activity: Not on file   Other Topics Concern    Not on file   Social History Narrative    Not on file            Family History   Problem Relation Age of Onset    No Known Problems Mother      No Known Problems Father           ROS     Visit Vitals  BP (!) 142/99   Pulse 81   Temp 97.6 °F (36.4 °C) (Temporal)   Wt 233 lb (105.7 kg)   BMI 28.36 kg/m²   Physical Exam  General   Mental Status - Patient is alert and oriented X3. Build & Nutrition - Well nourished.        Chest and Lung Exam   Chest and lung exam reveals  - normal excursion with symmetric chest walls, quiet, even and easy respiratory effort with no use of accessory muscles and on auscultation, normal breath sounds, no adventitious sounds and normal vocal resonance.        Cardiovascular   Cardiovascular examination reveals  - normal heart sounds, regular rate and rhythm with no murmurs.        Abdomen   Palpation/Percussion: Palpation and Percussion of the abdomen reveal - Non Tender, No Rebound tenderness, No Rigidity (guarding), No hepatosplenomegaly, No Palpable abdominal masses and Soft. Hernia - Bilateral - No Hernia(s) present.       Physical Exam     Assessment and Plan      ICD-10-CM ICD-9-CM     1. Peyronie's disease  N48.6 607.85     2. Erectile dysfunction, unspecified erectile dysfunction type  N52.9 607.84 trimethoprim-sulfamethoxazole (Bactrim DS) 160-800 mg per tablet         ciprofloxacin HCl (Cipro) 500 mg tablet              Orders Placed This Encounter    LORazepam (ATIVAN) 0.5 mg tablet       Sig: Take 1 tab one hour prior to procedure. May repeat at MRI if still anxious    trimethoprim-sulfamethoxazole (Bactrim DS) 160-800 mg per tablet       Sig: Take 1 Tab by mouth two (2) times a day. Start 3 days before surgery       Dispense:  40 Tab       Refill:  0    ciprofloxacin HCl (Cipro) 500 mg tablet       Sig: Take 1 Tab by mouth two (2) times a day for 20 days. Start 3 days before procedure       Dispense:  40 Tab       Refill:  0      Discussed different approaches, plaque excision with grafting, Nondalton tuck or IPP.    Would be good candidate for  CX , possible plaque modeling or incision of plaque.    Discussed risks of bleeding, infection, malfunction, erosion, penile shortening.

## 2021-03-18 NOTE — ANESTHESIA PREPROCEDURE EVALUATION
Relevant Problems   No relevant active problems       Anesthetic History   No history of anesthetic complications            Review of Systems / Medical History  Pertinent labs reviewed    Pulmonary  Within defined limits                 Neuro/Psych     seizures (complex seizures, last 2 months ago): well controlled    Psychiatric history     Cardiovascular  Within defined limits                Exercise tolerance: >4 METS     GI/Hepatic/Renal     GERD: well controlled           Endo/Other  Within defined limits           Other Findings            Physical Exam    Airway  Mallampati: II  TM Distance: 4 - 6 cm  Neck ROM: normal range of motion   Mouth opening: Normal     Cardiovascular  Regular rate and rhythm,  S1 and S2 normal,  no murmur, click, rub, or gallop             Dental    Dentition: Caps/crowns     Pulmonary  Breath sounds clear to auscultation               Abdominal  GI exam deferred       Other Findings            Anesthetic Plan    ASA: 2  Anesthesia type: general          Induction: Intravenous  Anesthetic plan and risks discussed with: Patient

## 2021-03-18 NOTE — PROGRESS NOTES
03/18/21 1759   Dual Skin Pressure Injury Assessment   Dual Skin Pressure Injury Assessment WDL   Second Care Provider (Based on [address] [place] [address]) Adonis Londono RN    Skin Integumentary   Skin Integumentary (WDL) X   Skin Color Appropriate for ethnicity   Skin Condition/Temp Warm;Dry   Skin Integrity Tattoos (comment); Incision (comment)  (penis)   Wound Prevention and Protection Methods   Orientation of Wound Prevention Posterior   Location of Wound Prevention Sacrum/Coccyx   Dressing Present  No   Wound Offloading (Prevention Methods) Bed, pressure reduction mattress

## 2021-03-19 VITALS
OXYGEN SATURATION: 94 % | DIASTOLIC BLOOD PRESSURE: 79 MMHG | TEMPERATURE: 98.5 F | SYSTOLIC BLOOD PRESSURE: 114 MMHG | HEIGHT: 76 IN | WEIGHT: 237 LBS | HEART RATE: 79 BPM | BODY MASS INDEX: 28.86 KG/M2 | RESPIRATION RATE: 17 BRPM

## 2021-03-19 PROCEDURE — 99218 HC RM OBSERVATION: CPT

## 2021-03-19 PROCEDURE — 74011000258 HC RX REV CODE- 258: Performed by: UROLOGY

## 2021-03-19 PROCEDURE — 74011000250 HC RX REV CODE- 250: Performed by: UROLOGY

## 2021-03-19 PROCEDURE — 74011250636 HC RX REV CODE- 250/636: Performed by: UROLOGY

## 2021-03-19 PROCEDURE — 74011250637 HC RX REV CODE- 250/637: Performed by: UROLOGY

## 2021-03-19 PROCEDURE — 2709999900 HC NON-CHARGEABLE SUPPLY

## 2021-03-19 RX ORDER — HYDROCODONE BITARTRATE AND ACETAMINOPHEN 5; 325 MG/1; MG/1
1 TABLET ORAL
Qty: 20 TAB | Refills: 0 | Status: SHIPPED | OUTPATIENT
Start: 2021-03-19 | End: 2021-03-22

## 2021-03-19 RX ORDER — VANCOMYCIN/0.9 % SOD CHLORIDE 1.5G/250ML
1500 PLASTIC BAG, INJECTION (ML) INTRAVENOUS EVERY 12 HOURS
Status: DISCONTINUED | OUTPATIENT
Start: 2021-03-19 | End: 2021-03-19 | Stop reason: HOSPADM

## 2021-03-19 RX ADMIN — HYDROMORPHONE HYDROCHLORIDE 1 MG: 1 INJECTION, SOLUTION INTRAMUSCULAR; INTRAVENOUS; SUBCUTANEOUS at 01:50

## 2021-03-19 RX ADMIN — ACETAMINOPHEN 650 MG: 325 TABLET ORAL at 13:39

## 2021-03-19 RX ADMIN — DEXTROSE MONOHYDRATE AND SODIUM CHLORIDE 75 ML/HR: 5; .45 INJECTION, SOLUTION INTRAVENOUS at 07:49

## 2021-03-19 RX ADMIN — GENTAMICIN SULFATE 480 MG: 40 INJECTION, SOLUTION INTRAMUSCULAR; INTRAVENOUS at 07:49

## 2021-03-19 RX ADMIN — HYDROCODONE BITARTRATE AND ACETAMINOPHEN 1 TABLET: 5; 325 TABLET ORAL at 04:25

## 2021-03-19 RX ADMIN — VANCOMYCIN HYDROCHLORIDE 1500 MG: 10 INJECTION, POWDER, LYOPHILIZED, FOR SOLUTION INTRAVENOUS at 01:53

## 2021-03-19 RX ADMIN — VANCOMYCIN HYDROCHLORIDE 1500 MG: 10 INJECTION, POWDER, LYOPHILIZED, FOR SOLUTION INTRAVENOUS at 12:00

## 2021-03-19 RX ADMIN — LAMOTRIGINE 200 MG: 100 TABLET ORAL at 07:50

## 2021-03-19 RX ADMIN — HYDROMORPHONE HYDROCHLORIDE 1 MG: 1 INJECTION, SOLUTION INTRAMUSCULAR; INTRAVENOUS; SUBCUTANEOUS at 07:50

## 2021-03-19 NOTE — PROGRESS NOTES
Admit Date: 3/18/2021    Subjective:     Meliton Garrido is resting.  Castellano in place, urine clear/yellow.  Reports surgical pain.    Objective:     Patient Vitals for the past 8 hrs:   BP Temp Pulse Resp SpO2   03/19/21 0735 124/75 98.5 °F (36.9 °C) 80 20 94 %   03/19/21 0433 130/84 97.6 °F (36.4 °C) 78 18 94 %     No intake/output data recorded.  03/17 1901 - 03/19 0700  In: 1200 [I.V.:1200]  Out: 1200 [Urine:1000]    Physical Exam:  GENERAL: alert, cooperative, no distress  LUNG: clear to auscultation bilaterally  HEART: regular rate and rhythm, S1, S2   ABDOMEN: soft, non-tender  NEUROLOGIC: AOx3  : scrotal ecchymosis, incision intact, no hematoma    Data Review   Recent Results (from the past 24 hour(s))   CREATININE    Collection Time: 03/18/21  6:24 PM   Result Value Ref Range    Creatinine 1.34 0.8 - 1.5 MG/DL       Assessment:     Active Problems:    Peyronie's disease (2/26/2020)      POD 1:    POSTOPERATIVE DIAGNOSES:  1.  Peyronie disease.  2.  Erectile dysfunction.     PROCEDURE PERFORMED:  Placement of inflatable penile prosthesis with manual modeling of penile curvature.    Afebrile, VSS    Plan:     Surgical dressings removed. Castellano removed.  Continue PRN pain control.  Home when pt voiding.  Place scrotal support on pt fluffed with gauze.  Needs ice pack today.     Delmis Cruz, VENUS  [facility]  Pt wo complaints. PE: penis normal. Scrotum with mild edema. OK for discharge.   I have reviewed the above note and examined the patient.  I agree with the exam, assessment and plan.  Return in 10 days. Start pulling pump down in scrotum when he returns.,   Pillo Roberts Jr., MD

## 2021-03-19 NOTE — DISCHARGE INSTRUCTIONS
Give information packet to patient at time of discharge (should be in his chart), remind patient to bring this to follow up appt. Patient information at time of d/c:    Billa Bolds out full course of antibiotics (typically two antibiotics prescribed)    Pull pump down into scrotum DAILY to avoid it riding up (start this in about 4-5 days)    Fluff scrotal support with 4x4s    May use Ice pack around the clock and prn. Shower pod 2, no tub baths.

## 2021-03-19 NOTE — PROGRESS NOTES
Care Management Interventions  Mode of Transport at Discharge: Self  Transition of Care Consult (CM Consult): Discharge Planning  Discharge Durable Medical Equipment: No  Physical Therapy Consult: No  Occupational Therapy Consult: No  Current Support Network: Own Home, Lives with Spouse  Confirm Follow Up Transport: Self  [place] Resource Information Provided?: No  Discharge Location  Discharge Placement: Home    Chart screened by  for discharge planning. Patient will likely return home at discharge. PT/OT have not been consulted at this time. CM will continue to follow patient during hospitalization for discharge planning and needs. No needs identified at this time. Please consult or notify  if any new issues arise.

## 2021-03-19 NOTE — PROGRESS NOTES
Pharmacokinetic Consult to Pharmacist    Rachna Juárez is a 50 y.o. male being treated for surgical prophylaxis with vancomycin and gentamicin. Height: 6' 4\" (193 cm)  Weight: 107.5 kg (237 lb)  Lab Results   Component Value Date/Time    BUN 10 02/26/2019 08:45 AM    Creatinine 1.34 03/18/2021 06:24 PM    WBC 6.0 03/18/2021 06:23 AM      Estimated Creatinine Clearance: 85.8 mL/min (based on SCr of 1.34 mg/dL). No results found for: Siena Moreira    Day 1 of vancomycin. Goal trough is 10-20. Will initiate 1500 mg q12 h. Day 2 of gentamicin. Patient was given one time dose of 530 mg post-op and is now on 480 mg q 24 h. Will continue to dose based on [place] nomogram.     Pharmacy will continue to monitor, order levels, and change doses as clinically appropriate.       Thank you,  Hardy Lucas, PharmD  Clinical Pharmacy Specialist  [phone]

## 2021-03-25 ENCOUNTER — APPOINTMENT (OUTPATIENT)
Dept: GENERAL RADIOLOGY | Age: 56
End: 2021-03-25
Attending: EMERGENCY MEDICINE
Payer: COMMERCIAL

## 2021-03-25 ENCOUNTER — HOSPITAL ENCOUNTER (EMERGENCY)
Age: 56
Discharge: HOME | End: 2021-03-25
Attending: EMERGENCY MEDICINE
Payer: COMMERCIAL

## 2021-03-25 VITALS
RESPIRATION RATE: 18 BRPM | SYSTOLIC BLOOD PRESSURE: 107 MMHG | DIASTOLIC BLOOD PRESSURE: 78 MMHG | TEMPERATURE: 98.8 F | HEART RATE: 96 BPM | OXYGEN SATURATION: 95 %

## 2021-03-25 DIAGNOSIS — U07.1 COVID-19 VIRUS INFECTION: Primary | ICD-10-CM

## 2021-03-25 LAB
ALBUMIN SERPL-MCNC: 3.2 G/DL (ref 3.5–5)
ALBUMIN/GLOB SERPL: 0.8 {RATIO} (ref 1.2–3.5)
ALP SERPL-CCNC: 59 U/L (ref 50–136)
ALT SERPL-CCNC: 42 U/L (ref 12–65)
ANION GAP SERPL CALC-SCNC: 8 MMOL/L (ref 7–16)
AST SERPL-CCNC: 38 U/L (ref 15–37)
BASOPHILS # BLD: 0 K/UL (ref 0–0.2)
BASOPHILS NFR BLD: 0 % (ref 0–2)
BILIRUB SERPL-MCNC: 0.5 MG/DL (ref 0.2–1.1)
BUN SERPL-MCNC: 17 MG/DL (ref 6–23)
CALCIUM SERPL-MCNC: 8.2 MG/DL (ref 8.3–10.4)
CHLORIDE SERPL-SCNC: 101 MMOL/L (ref 98–107)
CO2 SERPL-SCNC: 24 MMOL/L (ref 21–32)
CREAT SERPL-MCNC: 1.27 MG/DL (ref 0.8–1.5)
DIFFERENTIAL METHOD BLD: ABNORMAL
EOSINOPHIL # BLD: 0 K/UL (ref 0–0.8)
EOSINOPHIL NFR BLD: 0 % (ref 0.5–7.8)
ERYTHROCYTE [DISTWIDTH] IN BLOOD BY AUTOMATED COUNT: 12.5 % (ref 11.9–14.6)
GLOBULIN SER CALC-MCNC: 3.9 G/DL (ref 2.3–3.5)
GLUCOSE SERPL-MCNC: 101 MG/DL (ref 65–100)
HCT VFR BLD AUTO: 39.1 % (ref 41.1–50.3)
HGB BLD-MCNC: 13.5 G/DL (ref 13.6–17.2)
IMM GRANULOCYTES # BLD AUTO: 0.1 K/UL (ref 0–0.5)
IMM GRANULOCYTES NFR BLD AUTO: 1 % (ref 0–5)
LYMPHOCYTES # BLD: 1 K/UL (ref 0.5–4.6)
LYMPHOCYTES NFR BLD: 13 % (ref 13–44)
MCH RBC QN AUTO: 31.9 PG (ref 26.1–32.9)
MCHC RBC AUTO-ENTMCNC: 34.5 G/DL (ref 31.4–35)
MCV RBC AUTO: 92.4 FL (ref 79.6–97.8)
MONOCYTES # BLD: 0.7 K/UL (ref 0.1–1.3)
MONOCYTES NFR BLD: 9 % (ref 4–12)
NEUTS SEG # BLD: 5.8 K/UL (ref 1.7–8.2)
NEUTS SEG NFR BLD: 77 % (ref 43–78)
NRBC # BLD: 0 K/UL (ref 0–0.2)
PLATELET # BLD AUTO: 148 K/UL (ref 150–450)
PMV BLD AUTO: 9.1 FL (ref 9.4–12.3)
POTASSIUM SERPL-SCNC: 3.7 MMOL/L (ref 3.5–5.1)
PROT SERPL-MCNC: 7.1 G/DL (ref 6.3–8.2)
RBC # BLD AUTO: 4.23 M/UL (ref 4.23–5.6)
SODIUM SERPL-SCNC: 133 MMOL/L (ref 138–145)
WBC # BLD AUTO: 7.6 K/UL (ref 4.3–11.1)

## 2021-03-25 PROCEDURE — 99284 EMERGENCY DEPT VISIT MOD MDM: CPT

## 2021-03-25 PROCEDURE — 71046 X-RAY EXAM CHEST 2 VIEWS: CPT

## 2021-03-25 PROCEDURE — 80053 COMPREHEN METABOLIC PANEL: CPT

## 2021-03-25 PROCEDURE — 85025 COMPLETE CBC W/AUTO DIFF WBC: CPT

## 2021-03-25 PROCEDURE — 74011250636 HC RX REV CODE- 250/636: Performed by: EMERGENCY MEDICINE

## 2021-03-25 RX ORDER — DEXAMETHASONE 6 MG/1
6 TABLET ORAL
Qty: 5 TAB | Refills: 0 | Status: SHIPPED | OUTPATIENT
Start: 2021-03-25 | End: 2021-03-30

## 2021-03-25 RX ORDER — DOXYCYCLINE HYCLATE 100 MG
100 TABLET ORAL 2 TIMES DAILY
Qty: 14 TAB | Refills: 0 | Status: SHIPPED | OUTPATIENT
Start: 2021-03-25 | End: 2021-04-01

## 2021-03-25 RX ADMIN — SODIUM CHLORIDE 1000 ML: 900 INJECTION, SOLUTION INTRAVENOUS at 15:18

## 2021-03-25 NOTE — ED NOTES
Pt ambulated down barrera on RA and saturations remained above 93%. Pt reports dizziness upon standing. Kj Bradley MD aware.

## 2021-03-25 NOTE — ED NOTES
I have reviewed discharge instructions with the patient. The patient verbalized understanding. Patient left ED via Discharge Method: ambulatory to Home with self    Opportunity for questions and clarification provided. Patient given 2 scripts. No esign        To continue your aftercare when you leave the hospital, you may receive an automated call from our care team to check in on how you are doing. This is a free service and part of our promise to provide the best care and service to meet your aftercare needs.  If you have questions, or wish to unsubscribe from this service please call [phone]. Thank you for Choosing our [address] [place] [address] Emergency Department.

## 2021-03-25 NOTE — DISCHARGE INSTRUCTIONS
Take antibiotics as prescribed  It is important as you take Tylenol 1000 mg or ibuprofen 800 mg every 4-6 hours as needed for fever or body aches  Drink plenty of fluids  Monitor your oxygen levels at home  Return to the ER for any new, worsening or life-threatening symptoms

## 2021-03-25 NOTE — ED TRIAGE NOTES
Patient to triage with mask in place via ems in wheelchair. Patient to ed from doctor's care where he presented for increased shortness of breath and cough and per ems is covid positive. Patient he has had headache, fever, chills, shob and cough since Sunday afternoon. Doctors care placed 18 G iv in pt's right ac and gave 1l of normal saline. Patient room air sat with ems was 92% and he was 95% on 2 liters.

## 2021-03-25 NOTE — ED PROVIDER NOTES
Patient is a 29-year-old male who presents to the emergency room by EMS for chief complaint of fever, chills, shortness of breath, generalized fatigue since Sunday. Was at [facility] when he was found to have low 90s on room air. [facility] called EMS to have patient brought to emergency room. Patient denies any significant past medical history, denies tobacco use. The history is provided by the patient. Fatigue  This is a new problem. The current episode started more than 2 days ago. The problem has not changed since onset. Pertinent negatives include no focal weakness, no speech difficulty, no movement disorder, no agitation, no mental status change, no unresponsiveness and no disorientation. Patient reports a subjective fever - was not measured. Associated symptoms include shortness of breath. Pertinent negatives include no chest pain, no vomiting, no altered mental status, no confusion, no choking and no bowel incontinence. Associated medical issues do not include trauma, mood changes, seizures or dementia.         Past Medical History:   Diagnosis Date    Bipolar disorder ([facility] [address]     GERD (gastroesophageal reflux disease)     Hyperlipemia     Peyronie's disease     Seizures (HCC)     managed with Lamictal daily and sees Dr Emili Cunha, neurologist       Past Surgical History:   Procedure Laterality Date    HX ORTHOPAEDIC           Family History:   Problem Relation Age of Onset    No Known Problems Mother     No Known Problems Father        Social History     Socioeconomic History    Marital status:      Spouse name: Not on file    Number of children: Not on file    Years of education: Not on file    Highest education level: Not on file   Occupational History    Not on file   Social Needs    Financial resource strain: Not on file    Food insecurity     Worry: Not on file     Inability: Not on file    Transportation needs     Medical: Not on file     Non-medical: Not on file   Tobacco Use    Smoking status: Former Smoker    Smokeless tobacco: Never Used    Tobacco comment: Vapes   Substance and Sexual Activity    Alcohol use: Yes     Alcohol/week: 10.0 - 12.0 standard drinks     Types: 10 - 12 Cans of beer per week     Comment: 3 cans of beer daily     Drug use: Never    Sexual activity: Yes     Partners: Female   Lifestyle    Physical activity     Days per week: Not on file     Minutes per session: Not on file    Stress: Not on file   Relationships    Social connections     Talks on phone: Not on file     Gets together: Not on file     Attends Pentecostalism service: Not on file     Active member of club or organization: Not on file     Attends meetings of clubs or organizations: Not on file     Relationship status: Not on file    Intimate partner violence     Fear of current or ex partner: Not on file     Emotionally abused: Not on file     Physically abused: Not on file     Forced sexual activity: Not on file   Other Topics Concern    Not on file   Social History Narrative    Not on file         ALLERGIES: Patient has no known allergies. Review of Systems   Constitutional: Positive for fatigue. Negative for chills. HENT: Positive for congestion. Negative for trouble swallowing and voice change. Eyes: Negative for photophobia and redness. Respiratory: Positive for shortness of breath. Negative for choking and chest tightness. Cardiovascular: Negative for chest pain and leg swelling. Gastrointestinal: Negative for abdominal pain, bowel incontinence and vomiting. Endocrine: Negative for polyphagia and polyuria. Genitourinary: Negative for decreased urine volume and urgency. Musculoskeletal: Negative for back pain and gait problem. Skin: Negative for color change and pallor. Neurological: Negative for focal weakness, speech difficulty and weakness. Hematological: Negative for adenopathy. Does not bruise/bleed easily.    Psychiatric/Behavioral: Negative for agitation, confusion, dysphoric mood and hallucinations. All other systems reviewed and are negative. There were no vitals filed for this visit. Physical Exam  Vitals signs and nursing note reviewed. Constitutional:       General: He is not in acute distress. Appearance: Normal appearance. He is not ill-appearing. HENT:      Head: Normocephalic and atraumatic. Eyes:      General:         Right eye: No discharge. Left eye: No discharge. Extraocular Movements: Extraocular movements intact. Pupils: Pupils are equal, round, and reactive to light. Neck:      Musculoskeletal: Normal range of motion and neck supple. No neck rigidity or muscular tenderness. Cardiovascular:      Rate and Rhythm: Normal rate and regular rhythm. Pulses: Normal pulses. Heart sounds: Normal heart sounds. No murmur. No friction rub. No gallop. Pulmonary:      Effort: Pulmonary effort is normal. No respiratory distress. Breath sounds: No stridor. Rhonchi present. No wheezing. Abdominal:      General: Abdomen is flat. There is no distension. Palpations: Abdomen is soft. There is no mass. Hernia: No hernia is present. Musculoskeletal:      Right lower leg: No edema. Left lower leg: No edema. Skin:     General: Skin is warm and dry. Coloration: Skin is not jaundiced or pale. Findings: No bruising or erythema. Neurological:      General: No focal deficit present. Mental Status: He is alert and oriented to person, place, and time. Cranial Nerves: No cranial nerve deficit. Psychiatric:         Mood and Affect: Mood normal.         Behavior: Behavior normal.          MDM  Number of Diagnoses or Management Options  COVID-19 virus infection  Diagnosis management comments: Patient generally well-appearing here, no significant hypoxia during my evaluation. Plan to obtain chest x-ray, basic labs. Hydrate with fluids, check ambulatory sats.     4:21 PM  X-ray appears clear. Normal basic labs. Patient feels better. Will confirm ambulatory sats. 4:50 PM  Ambulatory sats fairly stable. Patient did not drop more than 93%. Reports some mild lightheadedness but feels better now. Overall he generally looks well. Will discharge home. Will be given a pulse ox to monitor oxygen saturations at home       Amount and/or Complexity of Data Reviewed  Clinical lab tests: ordered and reviewed  Tests in the radiology section of CPT®: ordered and reviewed  Review and summarize past medical records: yes  Independent visualization of images, tracings, or specimens: yes    Risk of Complications, Morbidity, and/or Mortality  Presenting problems: moderate  Diagnostic procedures: low  Management options: moderate    Patient Progress  Patient progress: stable         Procedures               Results Include:    Recent Results (from the past 24 hour(s))   CBC WITH AUTOMATED DIFF    Collection Time: 03/25/21 12:33 PM   Result Value Ref Range    WBC 7.6 4.3 - 11.1 K/uL    RBC 4.23 4.23 - 5.6 M/uL    HGB 13.5 (L) 13.6 - 17.2 g/dL    HCT 39.1 (L) 41.1 - 50.3 %    MCV 92.4 79.6 - 97.8 FL    MCH 31.9 26.1 - 32.9 PG    MCHC 34.5 31.4 - 35.0 g/dL    RDW 12.5 11.9 - 14.6 %    PLATELET 222 (L) 501 - 450 K/uL    MPV 9.1 (L) 9.4 - 12.3 FL    ABSOLUTE NRBC 0.00 0.0 - 0.2 K/uL    DF AUTOMATED      NEUTROPHILS 77 43 - 78 %    LYMPHOCYTES 13 13 - 44 %    MONOCYTES 9 4.0 - 12.0 %    EOSINOPHILS 0 (L) 0.5 - 7.8 %    BASOPHILS 0 0.0 - 2.0 %    IMMATURE GRANULOCYTES 1 0.0 - 5.0 %    ABS. NEUTROPHILS 5.8 1.7 - 8.2 K/UL    ABS. LYMPHOCYTES 1.0 0.5 - 4.6 K/UL    ABS. MONOCYTES 0.7 0.1 - 1.3 K/UL    ABS. EOSINOPHILS 0.0 0.0 - 0.8 K/UL    ABS. BASOPHILS 0.0 0.0 - 0.2 K/UL    ABS. IMM.  GRANS. 0.1 0.0 - 0.5 K/UL   METABOLIC PANEL, COMPREHENSIVE    Collection Time: 03/25/21 12:33 PM   Result Value Ref Range    Sodium 133 (L) 138 - 145 mmol/L    Potassium 3.7 3.5 - 5.1 mmol/L    Chloride 101 98 - 107 mmol/L    CO2 24 21 - 32 mmol/L    Anion gap 8 7 - 16 mmol/L    Glucose 101 (H) 65 - 100 mg/dL    BUN 17 6 - 23 MG/DL    Creatinine 1.27 0.8 - 1.5 MG/DL    GFR est AA >60 >60 ml/min/1.73m2    GFR est non-AA >60 >60 ml/min/1.73m2    Calcium 8.2 (L) 8.3 - 10.4 MG/DL    Bilirubin, total 0.5 0.2 - 1.1 MG/DL    ALT (SGPT) 42 12 - 65 U/L    AST (SGOT) 38 (H) 15 - 37 U/L    Alk. phosphatase 59 50 - 136 U/L    Protein, total 7.1 6.3 - 8.2 g/dL    Albumin 3.2 (L) 3.5 - 5.0 g/dL    Globulin 3.9 (H) 2.3 - 3.5 g/dL    A-G Ratio 0.8 (L) 1.2 - 3.5       Voice dictation software was used during the making of this note. This software is not perfect and grammatical and other typographical errors may be present. This note has been proofread, but may still contain errors.   Aliza Watkins MD; 3/25/2021 @4:21 PM   ===================================================================

## 2021-03-25 NOTE — LETTER
[address] [facility] EMERGENCY DEPT 
[address] [facility] [address] 
[phone] Work/School Note Date: 3/25/2021 To Whom It May concern: 
 
Tye William was seen and treated today in the emergency room by the following provider(s): 
Attending Provider: Newton Madden MD.   
 
Tye William was tested positive for COVID 19 and may return to work on 4/3/21. Sincerely, Rito Dupree

## 2022-03-18 PROBLEM — N48.6 PEYRONIE'S DISEASE: Status: ACTIVE | Noted: 2020-02-26

## 2022-11-08 ENCOUNTER — OFFICE VISIT (OUTPATIENT)
Dept: NEUROLOGY | Age: 57
End: 2022-11-08
Payer: COMMERCIAL

## 2022-11-08 VITALS — SYSTOLIC BLOOD PRESSURE: 157 MMHG | HEART RATE: 73 BPM | DIASTOLIC BLOOD PRESSURE: 100 MMHG

## 2022-11-08 DIAGNOSIS — R56.9 PARTIAL SEIZURES (HCC): Primary | ICD-10-CM

## 2022-11-08 PROCEDURE — 99214 OFFICE O/P EST MOD 30 MIN: CPT | Performed by: PSYCHIATRY & NEUROLOGY

## 2022-11-08 RX ORDER — LAMOTRIGINE 100 MG/1
TABLET ORAL
Qty: 450 TABLET | Refills: 3 | Status: SHIPPED | OUTPATIENT
Start: 2022-11-08

## 2022-11-08 RX ORDER — LACOSAMIDE 50 MG/1
TABLET ORAL
Qty: 180 TABLET | Refills: 5 | Status: SHIPPED | OUTPATIENT
Start: 2022-11-08 | End: 2022-11-09 | Stop reason: SDUPTHER

## 2022-11-08 ASSESSMENT — ENCOUNTER SYMPTOMS
ALLERGIC/IMMUNOLOGIC NEGATIVE: 1
GASTROINTESTINAL NEGATIVE: 1
EYES NEGATIVE: 1
RESPIRATORY NEGATIVE: 1

## 2022-11-08 NOTE — PROGRESS NOTES
[address] 60, [address]  [place], [address]  Phone: [phone] Fax [phone]  Dr. Arnulfo Oela      11/8/2022  Keyla [place]     Patient is referred by the following provider for consultation regarding as below:       I reviewed the available records and notes and have examined patient with the following findings:     Chief Complaint:  Chief Complaint   Patient presents with    Neurologic Problem     Unspecified convulsions           HPI: This is a right handed 56 y.o.  male who is very pleasant very appropriate gentleman who keeps having these recurrent episodes he is having for many many years where he just cannot communicate. It last about 10 to 15 seconds at the longest.  There is no loss of consciousness he can hear what is going on he can function when this happens he just cannot orally respond. He can get 1 or 2 of these a month at first if that is what or 2/month that he has 3 or 4 episodes. So each time he has 1 there is multiple. We started him on Lamictal after he was started on a medicine that caused taste changes and the Lamictal he is on 100 mg 2 in the morning 3 at night and it lowered the episodes to 5 episodes per year again in those episodes there is multiple events maybe 3-4 events. We suspect these are partial seizures. Dr. Sapphire Santiago did EEGs that were normal.  He is otherwise stable. He is doing well. IMAGING REVIEW:  I REVIEWED PERTINENT  IMAGES AND REPORTS WITH THE PATIENT PERSONALLY, DIRECTLY AND FULLY.      Past Medical History:  Past Medical History:   Diagnosis Date    Bipolar disorder ([facility] [address]     GERD (gastroesophageal reflux disease)     Hyperlipemia     Peyronie's disease     Seizures ([facility] [address]     managed with Lamictal daily and sees Dr Zelda Payne, neurologist       Past Surgical History:  Past Surgical History:   Procedure Laterality Date    ORTHOPEDIC SURGERY         Social History:  Social History     Socioeconomic History Normocephalic and atraumatic. Eyes:      Extraocular Movements: Extraocular movements intact and EOM normal.      Pupils: Pupils are equal, round, and reactive to light. Cardiovascular:      Rate and Rhythm: Normal rate and regular rhythm. Pulses: Normal pulses. Pulmonary:      Effort: Pulmonary effort is normal.   Abdominal:      General: Abdomen is flat. Neurological:      Mental Status: He is alert and oriented to person, place, and time. Gait: Gait is intact. Deep Tendon Reflexes:      Reflex Scores:       Tricep reflexes are 1+ on the right side and 1+ on the left side. Bicep reflexes are 1+ on the right side and 1+ on the left side. Brachioradialis reflexes are 1+ on the right side and 1+ on the left side. Patellar reflexes are 1+ on the right side and 1+ on the left side. Achilles reflexes are 1+ on the right side and 1+ on the left side. Neurologic Exam     Mental Status   Oriented to person, place, and time. Attention: normal. Concentration: normal.   Level of consciousness: alert  Knowledge: good. Cranial Nerves     CN II   Visual fields full to confrontation. CN III, IV, VI   Pupils are equal, round, and reactive to light. Extraocular motions are normal.     CN VII   Facial expression full, symmetric. Motor Exam   Right arm tone: normal  Left arm tone: normal  Right leg tone: normal  Left leg tone: normal    Gait, Coordination, and Reflexes     Gait  Gait: normal    Tremor   Resting tremor: absent  Intention tremor: absent  Action tremor: absent    Reflexes   Right brachioradialis: 1+  Left brachioradialis: 1+  Right biceps: 1+  Left biceps: 1+  Right triceps: 1+  Left triceps: 1+  Right patellar: 1+  Left patellar: 1+  Right achilles: 1+  Left achilles: 1+        Assessment   Assessment / Plan:    Prince interiano was seen today for neurologic problem.     Diagnoses and all orders for this visit:    Partial seizures [facility]) these are presumed despite the EEG being negative. This gentleman was having 1 or 2 episodes per month and an episode there could be 3 or 4 events of an capably of communicating. The Lamictal 100 mg 2 in the morning 3 at night lowered this significantly to 5 episodes a year but in those episodes she he can have 2 or 3 at 1 time. But a dramatic improvement. Labetalol seems to be causing some short-term memory loss at least he feels it is. The first medicine he started which we will have to go back and look caused change in taste. Dr. Brad Lorenz had ordered that product. At this time I Pedro Ibrahim add Vimpat 50 mg twice a day if this helps we can increase it up to higher levels and may be even lower Lamictal or. He is going to start taking notes on how frequently he is having these episodes. -     lacosamide (VIMPAT) 50 MG TABS tablet; Take one bid    Other orders  -     lamoTRIgine (LAMICTAL) 100 MG tablet; TAKE 2 TABLETS IN THE MORNING AND 3 TABLETS AT BEDTIME        The Diagnosis and differential diagnostic considerations, and Rx Tx were reviewed with the patient at length. No orders of the defined types were placed in this encounter. I have spent greater than 50% of visit discussing and counseling of patient  for treatment and diagnostic plan review. Total time30 min     . Notes: Patient is to continue all medications as directed by prescribing physicians. Continuations on today's visit are made based on the patient's report of current medications.              Dr. Mohan Mojica  Consultation Neurology, Neurodiagnostics and Neurotherapeutics  Neuroelectrophysiology, EEG, EMG  [facility]  [address] [place] [address], [address] [facility]  Phone:  [phone]  Fax:   [phone]

## 2022-11-09 DIAGNOSIS — R56.9 PARTIAL SEIZURES (HCC): ICD-10-CM

## 2022-11-09 RX ORDER — LACOSAMIDE 50 MG/1
TABLET ORAL
Qty: 180 TABLET | Refills: 5 | Status: SHIPPED | OUTPATIENT
Start: 2022-11-09 | End: 2022-12-09

## 2022-11-09 NOTE — TELEPHONE ENCOUNTER
Patient is requesting Vimpat sent to local pharmacy [facility]. [facility] is more expensive. Medication has been pended with correct pharmacy information.

## 2023-05-15 ENCOUNTER — OFFICE VISIT (OUTPATIENT)
Dept: NEUROLOGY | Age: 58
End: 2023-05-15
Payer: COMMERCIAL

## 2023-05-15 VITALS
DIASTOLIC BLOOD PRESSURE: 98 MMHG | BODY MASS INDEX: 30.08 KG/M2 | WEIGHT: 247 LBS | SYSTOLIC BLOOD PRESSURE: 151 MMHG | HEIGHT: 76 IN | OXYGEN SATURATION: 99 % | HEART RATE: 80 BPM

## 2023-05-15 DIAGNOSIS — R56.9 PARTIAL SEIZURES (HCC): ICD-10-CM

## 2023-05-15 PROCEDURE — 99214 OFFICE O/P EST MOD 30 MIN: CPT | Performed by: PSYCHIATRY & NEUROLOGY

## 2023-05-15 RX ORDER — LAMOTRIGINE 100 MG/1
TABLET ORAL
Qty: 450 TABLET | Refills: 3 | Status: SHIPPED | OUTPATIENT
Start: 2023-05-15

## 2023-05-15 RX ORDER — LACOSAMIDE 50 MG/1
TABLET ORAL
Qty: 60 TABLET | Refills: 5 | Status: SHIPPED | OUTPATIENT
Start: 2023-05-15 | End: 2023-06-14

## 2023-05-15 ASSESSMENT — ENCOUNTER SYMPTOMS
RESPIRATORY NEGATIVE: 1
EYES NEGATIVE: 1
ALLERGIC/IMMUNOLOGIC NEGATIVE: 1
GASTROINTESTINAL NEGATIVE: 1

## 2023-05-15 NOTE — PROGRESS NOTES
[address], [address] [facility], [address] [place] [address]  Phone: [phone] Fax [phone]  Dr. Alisson Beltrán      5/15/2023  Sparkle Smith     Patient is referred by the following provider for consultation regarding as below:       I reviewed the available records and notes and have examined patient with the following findings:     Chief Complaint:  Chief Complaint   Patient presents with    Follow-up          HPI: This is a right handed 56 y.o.  male who is very pleasant very appropriate gentleman who unfortunately has these recurrent episodes where he cannot communicate for 10 to 15 seconds. He is alert oriented during the whole thing he can function during it but he just cannot communicate or speak. So most like his brain imploded's. Last short period of time they come with about 5 episodes or 6 episodes when they do come they were coming every month he would get 6 or 7 these episodes but we started him on Lamictal 100 mg he takes 2 in the morning 3 at night and they dropped to 6 or 7 total episodes a year. So a dramatic improvement. We still with an EEG that is normal.  He does have bipolar disorder. But there is been such a dramatic improvement with him we wanted to add Vimpat 50 mg twice a day I a little while ago but unfortunately it never made it to the pharmacy. So he did not try it his last episode was yesterday and prior to that he has had about 5 last week. And that is very typical now they will go away for months. He also knows he cannot operate a motor vehicle until he 6 months episode free. IMAGING REVIEW:  I REVIEWED PERTINENT  IMAGES AND REPORTS WITH THE PATIENT PERSONALLY, DIRECTLY AND FULLY.      Past Medical History:  Past Medical History:   Diagnosis Date    Bipolar disorder ([facility] [address]     GERD (gastroesophageal reflux disease)     Hyperlipemia     Peyronie's disease     Seizures ([facility] [address]     managed with Lamictal daily and sees Dr Agnes Mixon, neurologist

## 2023-12-18 RX ORDER — LAMOTRIGINE 100 MG/1
TABLET ORAL
Qty: 450 TABLET | Refills: 3 | OUTPATIENT
Start: 2023-12-18

## 2024-09-23 ENCOUNTER — TELEPHONE (OUTPATIENT)
Dept: NEUROLOGY | Age: 59
End: 2024-09-23

## 2024-09-24 RX ORDER — LAMOTRIGINE 100 MG/1
TABLET ORAL
Qty: 450 TABLET | Refills: 3 | Status: SHIPPED | OUTPATIENT
Start: 2024-09-24

## 2024-10-18 ENCOUNTER — OFFICE VISIT (OUTPATIENT)
Dept: NEUROLOGY | Age: 59
End: 2024-10-18
Payer: COMMERCIAL

## 2024-10-18 VITALS — HEIGHT: 75 IN | BODY MASS INDEX: 28.85 KG/M2 | WEIGHT: 232 LBS

## 2024-10-18 DIAGNOSIS — R56.9 PARTIAL SEIZURES (HCC): Primary | ICD-10-CM

## 2024-10-18 PROCEDURE — 99214 OFFICE O/P EST MOD 30 MIN: CPT | Performed by: PSYCHIATRY & NEUROLOGY

## 2024-10-18 ASSESSMENT — ENCOUNTER SYMPTOMS
ALLERGIC/IMMUNOLOGIC NEGATIVE: 1
EYES NEGATIVE: 1
GASTROINTESTINAL NEGATIVE: 1
RESPIRATORY NEGATIVE: 1

## 2024-10-18 NOTE — PROGRESS NOTES
[facility]  [address] [facility], [address]  [place] [address]  Phone: [phone] Fax [phone]  Dr. Cezar Puentes      10/18/2024  Gabino Alves     Patient is referred by the following provider for consultation regarding as below:       I reviewed the available records and notes and have examined patient with the following findings:     Chief Complaint:  Chief Complaint   Patient presents with    Follow-up          HPI: This is a right handed 59 y.o.  male who is very pleasant very appropriate patient who unfortunately is having recurrent episodes that last about 10 to 15 seconds where he cannot communicate he is alert oriented he does everything he can drive he can if he is climbing a ladder it does not affect him.  If he is on a roof it does not affect him at.  It is just a communication issue.  When he gets these they are usually several a day before they cooldown.  He has about 6 using Lamictal 100 mg 2 in the morning 3 at night he has about 6 to 7 days a year where he has 2-3 episodes each of those days.  Last time I saw him which was May 2023 we tried him on Vimpat 50 mg twice a day that he does not even recall.  And Dr. John McBurney and tried the previous medication which we think is Dilantin.  He has had EEGs that are normal.  Has a history of bipolar disorder which was Lamictal was chosen initially.  The cross cover.  He feels that he is getting some memory problems associated with Lamictal and is hard to say whether he is or is not but I am willing to try to change the medicine.  He does have he has a full-blown seizure he could not operate a motor vehicle until he 6 months episode free.    IMAGING REVIEW:  I REVIEWED PERTINENT  IMAGES AND REPORTS WITH THE PATIENT PERSONALLY, DIRECTLY AND FULLY.     Past Medical History:  Past Medical History:   Diagnosis Date    Bipolar disorder (HCC)     GERD (gastroesophageal reflux disease)     Hyperlipemia     Peyronie's disease

## 2024-11-01 ENCOUNTER — TELEPHONE (OUTPATIENT)
Dept: NEUROLOGY | Age: 59
End: 2024-11-01

## 2024-11-01 ENCOUNTER — CLINICAL DOCUMENTATION (OUTPATIENT)
Dept: NEUROLOGY | Age: 59
End: 2024-11-01

## 2024-11-01 RX ORDER — ZONISAMIDE 100 MG/1
CAPSULE ORAL
Qty: 360 CAPSULE | Refills: 3 | Status: SHIPPED | OUTPATIENT
Start: 2024-11-01

## 2024-11-01 NOTE — TELEPHONE ENCOUNTER
Patient was seen 10/18. He called saying pharmacy doesn't have a new script. Per last office note. Patient uses [facility]  \"At the moment he has 6 to 7 days/year that he has episodes in and that day he usually has about 3 episodes. This is dramatically better using Lamictal 100 mg 2 in the morning 3 at night but again he wants to change it so we can switch him to zonisamide.\"

## 2025-03-05 ENCOUNTER — OFFICE VISIT (OUTPATIENT)
Dept: NEUROLOGY | Age: 60
End: 2025-03-05
Payer: COMMERCIAL

## 2025-03-05 DIAGNOSIS — R56.9 PARTIAL SEIZURE (HCC): Primary | ICD-10-CM

## 2025-03-05 PROCEDURE — 99214 OFFICE O/P EST MOD 30 MIN: CPT | Performed by: PSYCHIATRY & NEUROLOGY

## 2025-03-05 RX ORDER — ZONISAMIDE 100 MG/1
CAPSULE ORAL
Qty: 360 CAPSULE | Refills: 3 | Status: SHIPPED | OUTPATIENT
Start: 2025-03-05

## 2025-03-05 RX ORDER — LISINOPRIL 20 MG/1
20 TABLET ORAL DAILY
COMMUNITY
Start: 2025-01-21 | End: 2026-01-21

## 2025-03-05 ASSESSMENT — ENCOUNTER SYMPTOMS
ALLERGIC/IMMUNOLOGIC NEGATIVE: 1
EYES NEGATIVE: 1
RESPIRATORY NEGATIVE: 1

## 2025-03-05 NOTE — PROGRESS NOTES
diagnostic plan review. Total time30     .      Notes: Patient is to continue all medications as directed by prescribing physicians. Continuations on today's visit are made based on the patient's report of current medications.             Dr. Cezar Puentes  Consultation Neurology, Neurodiagnostics and Neurotherapeutics  Neuroelectrophysiology, EEG, EMG  [facility]  [address], [address]  [place] [address]  Phone: [phone] Fax [phone]

## 2025-06-19 ENCOUNTER — TELEPHONE (OUTPATIENT)
Dept: NEUROLOGY | Age: 60
End: 2025-06-19

## 2025-06-19 DIAGNOSIS — R56.9 PARTIAL SEIZURE (HCC): ICD-10-CM

## 2025-06-19 RX ORDER — ZONISAMIDE 100 MG/1
CAPSULE ORAL
Qty: 28 CAPSULE | Refills: 0 | Status: SHIPPED | OUTPATIENT
Start: 2025-06-19

## 2025-06-26 ENCOUNTER — OFFICE VISIT (OUTPATIENT)
Dept: INTERNAL MEDICINE CLINIC | Facility: CLINIC | Age: 60
End: 2025-06-26
Payer: COMMERCIAL

## 2025-06-26 VITALS
HEART RATE: 57 BPM | BODY MASS INDEX: 29.72 KG/M2 | DIASTOLIC BLOOD PRESSURE: 70 MMHG | WEIGHT: 239 LBS | OXYGEN SATURATION: 97 % | SYSTOLIC BLOOD PRESSURE: 104 MMHG | HEIGHT: 75 IN

## 2025-06-26 DIAGNOSIS — Z00.00 ANNUAL PHYSICAL EXAM: Primary | ICD-10-CM

## 2025-06-26 DIAGNOSIS — Z12.5 SCREENING FOR PROSTATE CANCER: ICD-10-CM

## 2025-06-26 DIAGNOSIS — Z11.9 SCREENING EXAMINATION FOR INFECTIOUS DISEASE: ICD-10-CM

## 2025-06-26 DIAGNOSIS — E78.2 MIXED HYPERLIPIDEMIA: ICD-10-CM

## 2025-06-26 DIAGNOSIS — I10 ESSENTIAL (PRIMARY) HYPERTENSION: ICD-10-CM

## 2025-06-26 PROBLEM — K21.9 GERD (GASTROESOPHAGEAL REFLUX DISEASE): Status: ACTIVE | Noted: 2025-06-26

## 2025-06-26 PROBLEM — E78.5 HYPERLIPIDEMIA: Status: ACTIVE | Noted: 2025-06-26

## 2025-06-26 PROCEDURE — 3078F DIAST BP <80 MM HG: CPT | Performed by: INTERNAL MEDICINE

## 2025-06-26 PROCEDURE — 3074F SYST BP LT 130 MM HG: CPT | Performed by: INTERNAL MEDICINE

## 2025-06-26 PROCEDURE — 99386 PREV VISIT NEW AGE 40-64: CPT | Performed by: INTERNAL MEDICINE

## 2025-06-26 SDOH — ECONOMIC STABILITY: FOOD INSECURITY: WITHIN THE PAST 12 MONTHS, YOU WORRIED THAT YOUR FOOD WOULD RUN OUT BEFORE YOU GOT MONEY TO BUY MORE.: NEVER TRUE

## 2025-06-26 SDOH — ECONOMIC STABILITY: FOOD INSECURITY: WITHIN THE PAST 12 MONTHS, THE FOOD YOU BOUGHT JUST DIDN'T LAST AND YOU DIDN'T HAVE MONEY TO GET MORE.: NEVER TRUE

## 2025-06-26 ASSESSMENT — ENCOUNTER SYMPTOMS
ABDOMINAL DISTENTION: 0
SHORTNESS OF BREATH: 0
COUGH: 0

## 2025-06-26 ASSESSMENT — PATIENT HEALTH QUESTIONNAIRE - PHQ9
SUM OF ALL RESPONSES TO PHQ QUESTIONS 1-9: 0
2. FEELING DOWN, DEPRESSED OR HOPELESS: NOT AT ALL
1. LITTLE INTEREST OR PLEASURE IN DOING THINGS: NOT AT ALL

## 2025-06-26 NOTE — PROGRESS NOTES
Chief Complaint   Patient presents with    Annual Exam        Gabino Alves is a 59 y.o. male who presents today for Annual Exam     New patient ,  for the last 15 years , has 3 kids , blended family  Works in electrical sales.  Seizure disorder > 14 years , following with neurology, multiple episodes  with difficulty with communication, describes these episodes to last 10 to 25 seconds, he can have 7 to 8/day, and then stop for more than a month, has been following with neurology closely since then, multiple medications have been attempted, but due to side effects they have been changed, currently on zonisamide for the last 3 to 4 months, less side effects, less frequent episodes, but they still happen, last episode was last week,    Has a history of hypertension, currently on lisinopril 40 mg,  History of hyperlipidemia on statin therapy,  He currently vapes,  He likes to golf at least once a week, and doing yard work, he does not have a routine of exercise  Reports a regular diet, fast food, restaurants, and only 1 good meal per day,  Reports lab today dental appointments, due for eye exam,  Up-to-date with colonoscopies, doing colonoscopy every 10 years  Denies any history of prostate cancer, is agreeable with PSA testing          Wt Readings from Last 3 Encounters:   06/26/25 108.4 kg (239 lb)   10/18/24 105.2 kg (232 lb)   05/15/23 112 kg (247 lb)         Assessment  And plan    1. Annual physical exam  -     Hemoglobin A1C; Future  -     CBC with Auto Differential; Future  -     Comprehensive Metabolic Panel; Future  -     Lipid Panel; Future  -     TSH reflex to FT4; Future  -     Hepatitis C Ab, Rflx to Qt by PCR; Future  -     HIV 1/2 Ag/Ab, 4TH Generation,W Rflx Confirm; Future  2. Essential (primary) hypertension  3. Mixed hyperlipidemia  4. Screening examination for infectious disease  -     Hepatitis C Ab, Rflx to Qt by PCR; Future  -     HIV 1/2 Ag/Ab, 4TH Generation,W Rflx

## 2025-08-29 DIAGNOSIS — Z11.9 SCREENING EXAMINATION FOR INFECTIOUS DISEASE: ICD-10-CM

## 2025-08-29 DIAGNOSIS — Z00.00 ANNUAL PHYSICAL EXAM: ICD-10-CM

## 2025-08-29 DIAGNOSIS — Z12.5 SCREENING FOR PROSTATE CANCER: ICD-10-CM

## 2025-08-29 LAB
ALBUMIN SERPL-MCNC: 4 G/DL (ref 3.5–5)
ALBUMIN/GLOB SERPL: 1.4 (ref 1–1.9)
ALP SERPL-CCNC: 71 U/L (ref 40–129)
ALT SERPL-CCNC: 42 U/L (ref 8–55)
ANION GAP SERPL CALC-SCNC: 10 MMOL/L (ref 7–16)
AST SERPL-CCNC: 28 U/L (ref 15–37)
BASOPHILS # BLD: 0.03 K/UL (ref 0–0.2)
BASOPHILS NFR BLD: 0.4 % (ref 0–2)
BILIRUB SERPL-MCNC: 0.5 MG/DL (ref 0–1.2)
BUN SERPL-MCNC: 10 MG/DL (ref 6–23)
CALCIUM SERPL-MCNC: 9.6 MG/DL (ref 8.8–10.2)
CHLORIDE SERPL-SCNC: 107 MMOL/L (ref 98–107)
CHOLEST SERPL-MCNC: 160 MG/DL (ref 0–200)
CO2 SERPL-SCNC: 23 MMOL/L (ref 20–29)
CREAT SERPL-MCNC: 1.07 MG/DL (ref 0.8–1.3)
DIFFERENTIAL METHOD BLD: ABNORMAL
EOSINOPHIL # BLD: 0.12 K/UL (ref 0–0.8)
EOSINOPHIL NFR BLD: 1.4 % (ref 0.5–7.8)
ERYTHROCYTE [DISTWIDTH] IN BLOOD BY AUTOMATED COUNT: 13.7 % (ref 11.9–14.6)
EST. AVERAGE GLUCOSE BLD GHB EST-MCNC: 111 MG/DL
GLOBULIN SER CALC-MCNC: 2.8 G/DL (ref 2.3–3.5)
GLUCOSE SERPL-MCNC: 96 MG/DL (ref 70–99)
HBA1C MFR BLD: 5.5 % (ref 0–5.6)
HCT VFR BLD AUTO: 40.3 % (ref 41.1–50.3)
HDLC SERPL-MCNC: 52 MG/DL (ref 40–60)
HDLC SERPL: 3.1 (ref 0–5)
HGB BLD-MCNC: 13.7 G/DL (ref 13.6–17.2)
HIV 1+2 AB+HIV1 P24 AG SERPL QL IA: NONREACTIVE
HIV 1/2 RESULT COMMENT: NORMAL
IMM GRANULOCYTES # BLD AUTO: 0.04 K/UL (ref 0–0.5)
IMM GRANULOCYTES NFR BLD AUTO: 0.5 % (ref 0–5)
LDLC SERPL CALC-MCNC: 91 MG/DL (ref 0–100)
LYMPHOCYTES # BLD: 2.04 K/UL (ref 0.5–4.6)
LYMPHOCYTES NFR BLD: 24.6 % (ref 13–44)
MCH RBC QN AUTO: 31.8 PG (ref 26.1–32.9)
MCHC RBC AUTO-ENTMCNC: 34 G/DL (ref 31.4–35)
MCV RBC AUTO: 93.5 FL (ref 82–102)
MONOCYTES # BLD: 0.62 K/UL (ref 0.1–1.3)
MONOCYTES NFR BLD: 7.5 % (ref 4–12)
NEUTS SEG # BLD: 5.43 K/UL (ref 1.7–8.2)
NEUTS SEG NFR BLD: 65.6 % (ref 43–78)
NRBC # BLD: 0 K/UL (ref 0–0.2)
PLATELET # BLD AUTO: 206 K/UL (ref 150–450)
PMV BLD AUTO: 9.8 FL (ref 9.4–12.3)
POTASSIUM SERPL-SCNC: 4 MMOL/L (ref 3.5–5.1)
PROT SERPL-MCNC: 6.8 G/DL (ref 6.3–8.2)
PSA SERPL-MCNC: 0.5 NG/ML (ref 0–4)
RBC # BLD AUTO: 4.31 M/UL (ref 4.23–5.6)
SODIUM SERPL-SCNC: 140 MMOL/L (ref 136–145)
TRIGL SERPL-MCNC: 85 MG/DL (ref 0–150)
TSH W FREE THYROID IF ABNORMAL: 1.38 UIU/ML (ref 0.27–4.2)
VLDLC SERPL CALC-MCNC: 17 MG/DL (ref 6–23)
WBC # BLD AUTO: 8.3 K/UL (ref 4.3–11.1)

## 2025-08-31 LAB
HCV AB SERPL QL IA: NORMAL
HCV IGG SERPL QL IA: NON REACTIVE S/CO RATIO

## (undated) DEVICE — TRAY PREP DRY W/ PREM GLV 2 APPL 6 SPNG 2 UNDPD 1 OVERWRAP

## (undated) DEVICE — DERMABOND SKIN ADH 0.7ML -- DERMABOND ADVANCED 12/BX

## (undated) DEVICE — SPONGE LAP 18X18IN STRL -- 5/PK

## (undated) DEVICE — SUTURE PDS II SZ 2-0 L27IN ABSRB VLT L26MM CT-2 1/2 CIR Z333H

## (undated) DEVICE — SUTURE VCRL SZ 3-0 L27IN ABSRB UD L26MM CT-2 1/2 CIR J232H

## (undated) DEVICE — SYR 10ML LUER LOK 1/5ML GRAD --

## (undated) DEVICE — BANDAGE COBAN 4 IN COMPR W4INXL5YD FOAM COHESIVE QUIK STK SELF ADH SFT

## (undated) DEVICE — 3M™ IOBAN™ 2 ANTIMICROBIAL INCISE DRAPE 6650EZ: Brand: IOBAN™ 2

## (undated) DEVICE — YANKAUER,BULB TIP,W/O VENT,RIGID,STERILE: Brand: MEDLINE

## (undated) DEVICE — BAG,DRAINAGE,4L,A/R TOWER,LL,SLIDE TAP: Brand: MEDLINE

## (undated) DEVICE — SYRINGE IRRIG 60ML SFT PLIABLE BLB EZ TO GRP 1 HND USE W/

## (undated) DEVICE — REM POLYHESIVE ADULT PATIENT RETURN ELECTRODE: Brand: VALLEYLAB

## (undated) DEVICE — SOLUTION IV 1000ML 0.9% SOD CHL

## (undated) DEVICE — DRAPE,SPLIT,CV,CLR ANES,STERILE: Brand: MEDLINE

## (undated) DEVICE — CATHETER URETH 16FR BLLN 5CC SIL ALLY W/ SIL HYDRGEL 2 W F

## (undated) DEVICE — SUTURE VCRL SZ 2-0 L27IN ABSRB UD L26MM SH 1/2 CIR J417H

## (undated) DEVICE — GARMENT,MEDLINE,DVT,INT,CALF,MED, GEN2: Brand: MEDLINE

## (undated) DEVICE — TIBURON TOP SHEET: Brand: CONVERTORS

## (undated) DEVICE — DRAIN SURG PENROSE 0.25X12 IN CLOSED WND DRAINAGE PREM SIL

## (undated) DEVICE — BLADE ASSEMB CLP HAIR FINE --

## (undated) DEVICE — SMALL BOWL SET-LF: Brand: MEDLINE INDUSTRIES, INC.

## (undated) DEVICE — SUTURE PDS II SZ 2-0 L27IN ABSRB VLT SH L26MM 1/2 CIR Z317H

## (undated) DEVICE — SHEET, T, LAPAROTOMY, STERILE: Brand: MEDLINE

## (undated) DEVICE — CARDINAL HEALTH FLEXIBLE LIGHT HANDLE COVER: Brand: CARDINAL HEALTH

## (undated) DEVICE — COVER,MAYO STAND,STERILE: Brand: MEDLINE

## (undated) DEVICE — DEVICE ERECTILE RESTR FOR PENILE PROS

## (undated) DEVICE — SYRINGE CATH TIP 50ML

## (undated) DEVICE — 3M™ COBAN™ NL STERILE NON-LATEX SELF-ADHERENT WRAP, 2082S, 2 IN X 5 YD (5 CM X 4,5 M), 36 ROLLS/CASE: Brand: 3M™ COBAN™

## (undated) DEVICE — JELLY LUBRICATING 10GM PREFIL SYR LUBE

## (undated) DEVICE — MEDI-VAC NON-CONDUCTIVE SUCTION TUBING: Brand: CARDINAL HEALTH

## (undated) DEVICE — MINOR SPLIT GENERAL: Brand: MEDLINE INDUSTRIES, INC.

## (undated) DEVICE — SUT CHRMC 3-0 27IN CT2 BRN --

## (undated) DEVICE — SYR 50ML LR LCK 1ML GRAD NSAF --

## (undated) DEVICE — SET BLD COLL 21GA TB L12IN NDL L0.75IN WNG GRN SIMP EZ TO

## (undated) DEVICE — INFLATABLE PENILE PROSTHESIS WITH MS PUMP ACCESSORY KIT: Brand: AMS 700 ACCESSORY KIT

## (undated) DEVICE — SYR LR LCK 1ML GRAD NSAF 30ML --

## (undated) DEVICE — BANDAGE,GAUZE,BULKEE II,4.5"X4.1YD,STRL: Brand: MEDLINE

## (undated) DEVICE — MEDI-VAC YANKAUER SUCTION HANDLE W/BULBOUS TIP: Brand: CARDINAL HEALTH

## (undated) DEVICE — DRAPE TWL SURG 16X26IN BLU ORB04] ALLCARE INC]

## (undated) DEVICE — CATHETER F BLLN 5CC 16FR 2 W HYDRGEL COAT LESS TRAUM LUB